# Patient Record
Sex: FEMALE | Race: WHITE | Employment: UNEMPLOYED | URBAN - NONMETROPOLITAN AREA
[De-identification: names, ages, dates, MRNs, and addresses within clinical notes are randomized per-mention and may not be internally consistent; named-entity substitution may affect disease eponyms.]

---

## 2017-11-20 ENCOUNTER — OFFICE VISIT (OUTPATIENT)
Dept: RHEUMATOLOGY | Age: 57
End: 2017-11-20
Payer: COMMERCIAL

## 2017-11-20 VITALS
SYSTOLIC BLOOD PRESSURE: 118 MMHG | HEART RATE: 96 BPM | DIASTOLIC BLOOD PRESSURE: 85 MMHG | BODY MASS INDEX: 34.34 KG/M2 | RESPIRATION RATE: 16 BRPM | WEIGHT: 193.8 LBS | HEIGHT: 63 IN

## 2017-11-20 DIAGNOSIS — M79.7 FIBROMYALGIA: ICD-10-CM

## 2017-11-20 DIAGNOSIS — G89.29 CHRONIC LEFT SHOULDER PAIN: ICD-10-CM

## 2017-11-20 DIAGNOSIS — M25.512 CHRONIC LEFT SHOULDER PAIN: ICD-10-CM

## 2017-11-20 DIAGNOSIS — G89.29 CHRONIC MIDLINE LOW BACK PAIN WITHOUT SCIATICA: ICD-10-CM

## 2017-11-20 DIAGNOSIS — M25.50 POLYARTHRALGIA: Primary | ICD-10-CM

## 2017-11-20 DIAGNOSIS — M54.50 CHRONIC MIDLINE LOW BACK PAIN WITHOUT SCIATICA: ICD-10-CM

## 2017-11-20 PROCEDURE — G8427 DOCREV CUR MEDS BY ELIG CLIN: HCPCS | Performed by: INTERNAL MEDICINE

## 2017-11-20 PROCEDURE — 3017F COLORECTAL CA SCREEN DOC REV: CPT | Performed by: INTERNAL MEDICINE

## 2017-11-20 PROCEDURE — G8484 FLU IMMUNIZE NO ADMIN: HCPCS | Performed by: INTERNAL MEDICINE

## 2017-11-20 PROCEDURE — 99244 OFF/OP CNSLTJ NEW/EST MOD 40: CPT | Performed by: INTERNAL MEDICINE

## 2017-11-20 PROCEDURE — G8417 CALC BMI ABV UP PARAM F/U: HCPCS | Performed by: INTERNAL MEDICINE

## 2017-11-20 PROCEDURE — 3014F SCREEN MAMMO DOC REV: CPT | Performed by: INTERNAL MEDICINE

## 2017-11-20 RX ORDER — HYDROCODONE BITARTRATE AND ACETAMINOPHEN 5; 325 MG/1; MG/1
1 TABLET ORAL EVERY 6 HOURS PRN
COMMUNITY
End: 2020-12-17 | Stop reason: SDUPTHER

## 2017-11-20 RX ORDER — FUROSEMIDE 20 MG/1
20 TABLET ORAL 2 TIMES DAILY
COMMUNITY
End: 2021-01-25 | Stop reason: SDUPTHER

## 2017-11-20 RX ORDER — PRASUGREL 10 MG/1
10 TABLET, FILM COATED ORAL DAILY
COMMUNITY
End: 2020-02-24 | Stop reason: SDUPTHER

## 2017-11-20 RX ORDER — DULOXETIN HYDROCHLORIDE 30 MG/1
30 CAPSULE, DELAYED RELEASE ORAL DAILY
Qty: 30 CAPSULE | Refills: 1 | Status: SHIPPED | OUTPATIENT
Start: 2017-11-20 | End: 2019-06-13 | Stop reason: ALTCHOICE

## 2017-11-20 ASSESSMENT — ENCOUNTER SYMPTOMS
EYES NEGATIVE: 1
SPUTUM PRODUCTION: 0
GASTROINTESTINAL NEGATIVE: 1
SHORTNESS OF BREATH: 1
COUGH: 0
WHEEZING: 0

## 2017-11-20 NOTE — LETTER
[address] [address]  Phone: [phone]  Fax: [phone]    Sally Post CNP        November 20, 2017       Patient: Shamar Orozco   MR Number: [id number]   YOB: 1960   Date of Visit: 11/20/2017       Dear Dr. Jory Conte: Thank you for the request for consultation for Anastasiia William to me for the evaluation of polyarthralgia. Below are the relevant portions of my assessment and plan of care. If you have questions, please do not hesitate to call me. I look forward to following Emma along with you.     Sincerely,        Ginger URBINA,     CC providers:  Aixa Corado CNP  [address] [place] [address]

## 2017-11-20 NOTE — PATIENT INSTRUCTIONS
adapted under license by [facility] ([facility]). If you have questions about a medical condition or this instruction, always ask your healthcare professional. [place] [address] any warranty or liability for your use of this information. Patient Education        Trochanteric Bursitis: Exercises  Your Care Instructions  Here are some examples of typical rehabilitation exercises for your condition. Start each exercise slowly. Ease off the exercise if you start to have pain. Your doctor or physical therapist will tell you when you can start these exercises and which ones will work best for you. How to do the exercises  Hamstring wall stretch    1. Lie on your back in a doorway, with your good leg through the open door. 2. Slide your affected leg up the wall to straighten your knee. You should feel a gentle stretch down the back of your leg. ¨ Do not arch your back. ¨ Do not bend either knee. ¨ Keep one heel touching the floor and the other heel touching the wall. Do not point your toes. 3. Hold the stretch for at least 1 minute to begin. Then try to lengthen the time you hold the stretch to as long as 6 minutes. 4. Repeat 2 to 4 times. If you do not have a place to do this exercise in a doorway, there is another way to do it:  4. Lie on your back, and bend the knee of your affected leg. 5. Loop a towel under the ball and toes of that foot, and hold the ends of the towel in your hands. 6. Straighten your knee, and slowly pull back on the towel. You should feel a gentle stretch down the back of your leg. 7. Hold the stretch for 15 to 30 seconds. Or even better, hold the stretch for 1 minute if you can. 8. Repeat 2 to 4 times. Straight-leg raises to the outside    6. Lie on your side, with your affected leg on top. 7. Tighten the front thigh muscles of your top leg to keep your knee straight.   8. Keep your hip and your leg straight in line with the rest of your body, and keep your knee pointing forward. Do not drop your hip back. 9. Lift your top leg straight up toward the ceiling, about 12 inches off the floor. Hold for about 6 seconds, then slowly lower your leg. 10. Repeat 8 to 12 times. Clamshell    5. Lie on your side, with your affected leg on top and your head propped on a pillow. Keep your feet and knees together and your knees bent. 6. Raise your top knee, but keep your feet together. Do not let your hips roll back. Your legs should open up like a clamshell. 7. Hold for 6 seconds. 8. Slowly lower your knee back down. Rest for 10 seconds. 9. Repeat 8 to 12 times. Standing quadriceps stretch    7. If you are not steady on your feet, hold on to a chair, counter, or wall. You can also lie on your stomach or your side to do this exercise. 8. Bend the knee of the leg you want to stretch, and reach behind you to grab the front of your foot or ankle with the hand on the same side. For example, if you are stretching your right leg, use your right hand. 9. Keeping your knees next to each other, pull your foot toward your buttock until you feel a gentle stretch across the front of your hip and down the front of your thigh. Your knee should be pointed directly to the ground, and not out to the side. 10. Hold the stretch for 15 to 30 seconds. 11. Repeat 2 to 4 times. Piriformis stretch    5. Lie on your back with your legs straight. 6. Lift your affected leg and bend your knee. With your opposite hand, reach across your body, and then gently pull your knee toward your opposite shoulder. 7. Hold the stretch for 15 to 30 seconds. 8. Repeat 2 to 4 times. Double knee-to-chest    1. Lie on your back with your knees bent and your feet flat on the floor. You can put a small pillow under your head and neck if it is more comfortable. 2. Bring both knees to your chest.  3. Keep your lower back pressed to the floor. Hold for 15 to 30 seconds.   4. Relax, and lower your knees to the starting as you move your arm up. Hold that position for a count of 15 to 30 seconds. Walk your fingers down to the starting position. Repeat 2 to 4 times, trying to reach higher each time. ¨ Wall climbing (to the front). Face a wall, standing so your fingers can just touch it. Walk the fingers of your affected arm up the wall as high as pain permits. Try not to shrug your shoulder up toward your ear as you move your arm up. Hold that position for a count of 15 to 30 seconds. Slowly walk your fingers to the starting position. Repeat 2 to 4 times, trying to reach higher each time. · Rest your shoulder when you are not doing stretches and other exercises. Your doctor may tell you to wait for the pain to go away before doing exercises. Do not lift heavy bags of groceries, play sports, or do anything else that makes you twist or stress your shoulder. Avoid activities where you move your affected arm above your head. When should you call for help? Call your doctor now or seek immediate medical care if:  · You have severe pain. · You cannot move your shoulder or arm. · You have tingling or numbness in your arm or hand. · Your arm or hand is cool or pale. Watch closely for changes in your health, and be sure to contact your doctor if:  · Your pain gets worse. · You have new or worse swelling in your arm or hand. · You do not get better as expected. Where can you learn more? Go to [email or web address] and sign in to your [employer] account. Enter E207 in the [facility] box to learn more about \"Rotator Cuff Problems: Care Instructions. \"     If you do not have an account, please click on the \"Sign Up Now\" link. Current as of: March 21, 2017  Content Version: 11.3  © 7160-5463 [employer]. Care instructions adapted under license by [facility] ([facility]).  If you have questions about a medical condition or this instruction, always ask your healthcare professional. Elier Payne disclaims any warranty or liability for your use of this information.

## 2017-11-20 NOTE — PROGRESS NOTES
Reported swelling without redness/warmth of the fingers. Dependent swelling of the ankles. - AM stiffness lasting hours, imprved with movement. (+) gelling  - (+) insomnia b/c of soreness, laying on the joint. - persistent fatigue  - Numbness of hands mainly at night and early morning.   - myalgia, \"everywhere\". -denies Photosenstivity, Rash, dry mouth/dry eyes, oral/nasal sores, Raynaud's, digital ulcerations, skin tightening, renal disease, foamy urination, hematuria, sz's, blood clots, pre-term labor loss, AIHA, leukpenia/lymphopenia, thrombocytopenia, hair loss, serositis, arthritis. - denies enthesitis, dactylitis, nail changes, hx of STD,  personal or family history of ank spond,       Cancer screening: up to date   Travel:denies   Exposure(s): denies  FmHX: Psoriatic arthritis: sister     ROS:  Review of Systems   Constitutional: Positive for malaise/fatigue. HENT: Negative. Eyes: Negative. Respiratory: Positive for shortness of breath (with rest). Negative for cough, sputum production and wheezing. Cardiovascular: Positive for chest pain and leg swelling. Gastrointestinal: Negative. Genitourinary: Negative. Musculoskeletal: Positive for myalgias. Skin: Negative. Neurological: Positive for tingling, weakness and headaches. Endo/Heme/Allergies: Bruises/bleeds easily. Psychiatric/Behavioral: The patient is nervous/anxious. PAST MEDICAL HISTORY  No past medical history on file.     SOCIAL HISTORY  Social History     Social History    Marital status:      Spouse name: N/A    Number of children: N/A    Years of education: N/A     Social History Main Topics    Smoking status: Current Every Day Smoker    Smokeless tobacco: Not on file    Alcohol use Yes    Drug use: Unknown    Sexual activity: Not on file     Other Topics Concern    Not on file     Social History Narrative    No narrative on file       FAMILY HISTORY  Family History   Problem Relation Age of Onset    Early Death Neg Hx        SURGICAL HISTORY  No past surgical history on file. ALLERGIES  No Known Allergies    CURRENT MEDICATIONS  Current Outpatient Prescriptions   Medication Sig Dispense Refill    ALPRAZolam (XANAX) 1 MG tablet Take 1 mg by mouth nightly as needed for Anxiety      aspirin 81 MG tablet Take 81 mg by mouth daily      clopidogrel (PLAVIX) 75 MG tablet Take 75 mg by mouth daily      estradiol (ESTRACE) 0.5 MG tablet Take 0.5 mg by mouth daily      vitamin B-12 (CYANOCOBALAMIN) 1000 MCG tablet Take 1,000 mcg by mouth daily      metoprolol tartrate (LOPRESSOR) 25 MG tablet Take 25 mg by mouth 2 times daily      DHA-EPA-Coenzyme Q10-Vitamin E (COQ-10 & FISH OIL PO) Take by mouth      atorvastatin (LIPITOR) 80 MG tablet Take 80 mg by mouth daily      Estrogens Conjugated (PREMARIN PO) Take  by mouth. No current facility-administered medications for this visit. Objective:  /85   Pulse 96   Resp 16   Ht 5' 2.99\" (1.6 m)   Wt 193 lb 12.8 oz (87.9 kg)   BMI 34.34 kg/m²     General: No distress. Alert. Eyes: P ERRL. No sclera icterus. No conjunctival injection. ENT: No discharge. Pharynx clear. Neck: Trachea midline. Normal thyroid. Resp: No accessory muscle use. No crackles. No wheezing. No rhonchi. No dullness on percussion. CV: Regular rate. Regular rhythm. No mumur or rub. No edema. M/S:   Upper extremities:  Shoulders: Muscle strength 4+/5 in the left shoulder, limited left shoulder ROM to around 120 degree flexion and extension     - (+) Lift off, infraspinatus, [place], speed. Bilateral hawking. Negative scarf. - AC and lateral shoulder bilateral tenderness   Elbow: tenderness bilaterally. (+) bilateral tinel testing. Wrists: (+) Tinel testing. Negative Phalen testing. Finger: tender left MCPs and 4th PIP.  No synovitis    - (+) bilateral finkelstein    Lower Extremities:   Muscle strength 5/5, FROM, No Synovitis   Hips: localized tenderness along the lateral hip. Knees: tenderness along the medial knees. Denies patellar grind, medial/lateral compression, anterior/posterior compression  Ankles: non-tender. Feet: right lateral deviation 5th DIP deviation. Back:   - intact ROM, Negative spurling compression, SLR and cross SLR  - tenderness and hypertonicity of the perspinal musculature of the entire spine, trapezius musculature, and periscapular tenderness. There are 18/18  tender fibromyalgia points. Neuro: CN II-XII grossly intact, DTR's 2/4 bilat and equal  Psych: Oriented to person, place, time. No anxiety or agitation. Skin: Warm and dry. No nodule on exposed extremities. No rash on exposed extremities. Lymph: No cervical LAD. No supraclavicular LAD. RAPID 3 18.3     LABS:  CBC  No results found for: WBC, RBC, HGB, HCT, MCV, MCH, MCHC, RDW, PLT    CMP  No results found for: CALCIUM, LABALBU, PROT, NA, K, CO2, CL, BUN, CREATININE, ALT, AST    HgBA1c: No components found for: HGBA1C    No results found for: TSHFT4, TSH  No results found for: VITD25      No results found for: PAT  No components found for: SSAABIGGREF  No components found for: SSBABIGGREF  No components found for: SMITHABIGGAR  No results found for: DSDNAAB   No results found for: C3, C4  No components found for: CYCCITPEPIGG  No results found for: RF    No components found for: CANCASCRN, APANCASCRN  No results found for: SEDRATE  No results found for: CRP    RADIOLOGY:       Assessment/ Plan:      Polyarthralgia: The patient reports having generalized myalgia and genearlized joint pains of the fingers/hands, Left shoulder, neck , entire spine, latearl hip, left knee and bilateral ankles. Most severe in the neck and back, but reports \"hurt all over\" Symptoms started: around 4213-6150 in the neck, back that then progressed to the shoulders,  Hands and has since become more generalized.  [place] Hensen down steps about 15 years ago (~2002), Denies other injuries, medications (muscle relaxants, ssri's) with the patient. - would defer further treatment options as above. - trail of cymbalta 30mg daily. We have discussed the potential risk with Cymbalta which include but not limited to  somnolence, fatigue, Nausea, vomiting, risk of allergic reaction, LFT elevation, acute glaucoma,  increased depression, nightmares,  and alix rey's dz to list a few with cymblata. Left shoulder pain:   - localized shoulder pain, limited and painful ROM. Difficulty reaching behind the back. Left shoulder (+) (+) Lift off, infraspinatus, [place], speed. Bilateral hawking. Negative scarf.    - ddx: rotator cuff syndrome vs bursitis vs impingement vs other.    - x-ray left shoulder discussed. - if shoulder pain persists could try physical therapy or shoulder injection       Bilateral Hand paresthesia:   - - Numbness of hands mainly at night and early morning and resting arms on arm rests. (+) Tinel testing bilateral cubital tunnel region and wrists. - 2016 EMG/NCV bilateral upper extremities Negative  - discussed elbow bracing,   - stretches provided. Bilateral hip pain:   - suspected trochanteric bursitis vs gluteal tendinosis  - prior bursal injections (significant relief)/   - home stretches and exercises provided. - discussed repeating bursal injections and trial of physical therapy. Chronic low back pain w/ rare radiculopathy:   - denies fecal/urinary incontinence, saddle anesthesia, wt loss, fevers, (+) Prolonged AM stiffness.   - prior X-ray evaluation with abnormalities per pt.    - discussed physical therapy. - cymbalta may help with chronic back and fibromyalgia. - prior treatment as above. Return in about 3 months (around 2/20/2018). Electronically signed by Erik Guy DO on 11/20/2017 at 11:00 AM      Thank you for allowing me to participate in the care of this patient. Please call if there are any questions.       Addendum: 12/1/17   -

## 2017-11-20 NOTE — Clinical Note
Please inform the blood testing revealed no abnoramlities. She is going to be discharged back to her PCP for treatment of her Underlying fibromyalgia. Recommendations for the hip and back pain have been provided to her PCP.

## 2017-11-20 NOTE — Clinical Note
Please inform the pt that the x-ray of the lower back revealed changes consistent with osteoarthritis. The left shoulder x-ray revealed changes of mild arthritis in the left shoulder around the acromioclavicular joint.

## 2017-12-01 LAB
A/G RATIO: 2.1 (ref 1.5–2.5)
ABSOLUTE BASO #: 100 /CMM (ref 0–200)
ABSOLUTE EOS #: 300 /CMM (ref 0–500)
ABSOLUTE LYMPH #: 2500 /CMM (ref 1000–4800)
ABSOLUTE MONO #: 600 /CMM (ref 0–800)
ABSOLUTE NEUT #: 4300 /CMM (ref 1800–7700)
ALBUMIN SERPL-MCNC: 4.6 GM/DL (ref 3.5–5)
ALP BLD-CCNC: 56 IU/L (ref 39–118)
ALT SERPL-CCNC: 30 IU/L (ref 10–40)
ANION GAP SERPL CALCULATED.3IONS-SCNC: 6 MMOL/L (ref 4–12)
AST SERPL-CCNC: 26 IU/L (ref 15–41)
BASOPHILS RELATIVE PERCENT: 0.7 % (ref 0–2)
BILIRUB SERPL-MCNC: 0.8 MG/DL (ref 0.2–1)
BUN BLDV-MCNC: 17 MG/DL (ref 7–20)
C-REACTIVE PROTEIN: < 0.5 MG/DL
CALCIUM SERPL-MCNC: 9.7 MG/DL (ref 8.8–10.5)
CHLORIDE BLD-SCNC: 102 MEQ/L (ref 101–111)
CO2: 29 MEQ/L (ref 21–32)
CREAT SERPL-MCNC: 0.93 MG/DL (ref 0.6–1.3)
CREATININE CLEARANCE: >60
EOSINOPHILS RELATIVE PERCENT: 3.4 % (ref 0–6)
GLUCOSE: 119 MG/DL (ref 70–110)
HCT VFR BLD CALC: 41 % (ref 35–44)
HEMOGLOBIN: 14.3 GM/DL (ref 12–15)
LYMPHOCYTES RELATIVE PERCENT: 31.8 % (ref 15–45)
MCH RBC QN AUTO: 32.5 PG (ref 27.5–33)
MCHC RBC AUTO-ENTMCNC: 34.9 GM/DL (ref 33–36)
MCV RBC AUTO: 93.2 CU MIC (ref 80–97)
MONOCYTES RELATIVE PERCENT: 7.7 % (ref 2–10)
NEUTROPHILS RELATIVE PERCENT: 56.4 % (ref 40–70)
NUCLEATED RBCS: 0.1 /100 WBC
PDW BLD-RTO: 12.9 % (ref 12–16)
PLATELET # BLD: 280 TH/CMM (ref 150–400)
POTASSIUM SERPL-SCNC: 4.7 MEQ/L (ref 3.6–5)
RBC # BLD: 4.4 MIL/CMM (ref 4–5.1)
SEDIMENTATION RATE, ERYTHROCYTE: 7 MM/HR
SODIUM BLD-SCNC: 137 MEQ/L (ref 135–145)
TOTAL PROTEIN: 6.8 G/DL (ref 6.2–8)
VITAMIN D 25-HYDROXY: 39.08 NG/ML (ref 30–100)
WBC # BLD: 7.7 TH/CMM (ref 4.4–10.5)

## 2017-12-04 ENCOUNTER — TELEPHONE (OUTPATIENT)
Dept: RHEUMATOLOGY | Age: 57
End: 2017-12-04

## 2019-05-07 ENCOUNTER — TELEPHONE (OUTPATIENT)
Dept: CARDIOLOGY CLINIC | Age: 59
End: 2019-05-07

## 2019-05-07 NOTE — TELEPHONE ENCOUNTER
Called Dr. Atul Schulz office, [facility] and Dr. Shahla Agudelo office at [facility] for records. Records being sent.

## 2019-06-11 ENCOUNTER — HOSPITAL ENCOUNTER (OUTPATIENT)
Age: 59
Discharge: HOME | End: 2019-06-11
Payer: MEDICARE

## 2019-06-11 LAB
ALBUMIN SERPL-MCNC: 4.2 G/DL (ref 3.5–5.1)
ALP BLD-CCNC: 84 U/L (ref 38–126)
ALT SERPL-CCNC: 20 U/L (ref 11–66)
ANION GAP SERPL CALCULATED.3IONS-SCNC: 12 MEQ/L (ref 8–16)
AST SERPL-CCNC: 20 U/L (ref 5–40)
BILIRUB SERPL-MCNC: 0.4 MG/DL (ref 0.3–1.2)
BILIRUBIN DIRECT: < 0.2 MG/DL (ref 0–0.3)
BUN BLDV-MCNC: 15 MG/DL (ref 7–22)
CALCIUM SERPL-MCNC: 9.8 MG/DL (ref 8.5–10.5)
CHLORIDE BLD-SCNC: 104 MEQ/L (ref 98–111)
CHOLESTEROL, TOTAL: 124 MG/DL (ref 100–199)
CO2: 25 MEQ/L (ref 23–33)
CREAT SERPL-MCNC: 0.7 MG/DL (ref 0.4–1.2)
GFR SERPL CREATININE-BSD FRML MDRD: 85 ML/MIN/1.73M2
GLUCOSE BLD-MCNC: 113 MG/DL (ref 70–108)
HDLC SERPL-MCNC: 66 MG/DL
LDL CHOLESTEROL CALCULATED: 25 MG/DL
MAGNESIUM: 2.2 MG/DL (ref 1.6–2.4)
POTASSIUM SERPL-SCNC: 4.3 MEQ/L (ref 3.5–5.2)
SODIUM BLD-SCNC: 141 MEQ/L (ref 135–145)
TOTAL PROTEIN: 7.1 G/DL (ref 6.1–8)
TRIGL SERPL-MCNC: 164 MG/DL (ref 0–199)

## 2019-06-11 PROCEDURE — 36415 COLL VENOUS BLD VENIPUNCTURE: CPT

## 2019-06-11 PROCEDURE — 82248 BILIRUBIN DIRECT: CPT

## 2019-06-11 PROCEDURE — 83735 ASSAY OF MAGNESIUM: CPT

## 2019-06-11 PROCEDURE — 80061 LIPID PANEL: CPT

## 2019-06-11 PROCEDURE — 80053 COMPREHEN METABOLIC PANEL: CPT

## 2019-06-13 ENCOUNTER — OFFICE VISIT (OUTPATIENT)
Dept: CARDIOLOGY CLINIC | Age: 59
End: 2019-06-13
Payer: MEDICARE

## 2019-06-13 VITALS
BODY MASS INDEX: 34.3 KG/M2 | HEIGHT: 63 IN | SYSTOLIC BLOOD PRESSURE: 130 MMHG | WEIGHT: 193.6 LBS | DIASTOLIC BLOOD PRESSURE: 82 MMHG | HEART RATE: 80 BPM

## 2019-06-13 DIAGNOSIS — I25.10 CORONARY ARTERY DISEASE INVOLVING NATIVE CORONARY ARTERY OF NATIVE HEART WITHOUT ANGINA PECTORIS: Primary | ICD-10-CM

## 2019-06-13 DIAGNOSIS — Z95.1 HX OF CABG: ICD-10-CM

## 2019-06-13 PROCEDURE — 99214 OFFICE O/P EST MOD 30 MIN: CPT | Performed by: INTERNAL MEDICINE

## 2019-06-13 RX ORDER — ISOSORBIDE MONONITRATE 60 MG/1
60 TABLET, EXTENDED RELEASE ORAL DAILY
COMMUNITY
End: 2019-06-13 | Stop reason: SDUPTHER

## 2019-06-13 RX ORDER — NITROGLYCERIN 0.4 MG/1
0.4 TABLET SUBLINGUAL EVERY 5 MIN PRN
Qty: 25 TABLET | Refills: 3 | Status: SHIPPED | OUTPATIENT
Start: 2019-06-13 | End: 2020-03-23 | Stop reason: SDUPTHER

## 2019-06-13 RX ORDER — BIOTIN 1 MG
1000 TABLET ORAL DAILY
COMMUNITY
End: 2021-06-03

## 2019-06-13 RX ORDER — ISOSORBIDE MONONITRATE 60 MG/1
60 TABLET, EXTENDED RELEASE ORAL DAILY
Qty: 90 TABLET | Refills: 3 | Status: SHIPPED | OUTPATIENT
Start: 2019-06-13 | End: 2020-06-25 | Stop reason: SDUPTHER

## 2019-06-13 RX ORDER — MULTIVIT WITH MINERALS/LUTEIN
1000 TABLET ORAL DAILY
COMMUNITY

## 2019-06-13 RX ORDER — NITROGLYCERIN 0.4 MG/1
0.4 TABLET SUBLINGUAL EVERY 5 MIN PRN
COMMUNITY
End: 2019-06-13 | Stop reason: SDUPTHER

## 2019-06-13 NOTE — PROGRESS NOTES
[address] [facility] CARDIOLOGY  [address] [address]  Dept: [phone]  Dept Fax: [phone]  Loc: [phone]    Visit Date: 6/13/2019    Ms. Shannon Zarate is a 60 y.o. female  who presented for:  Chief Complaint   Patient presents with    New Patient    Coronary Artery Disease       HPI:   HPI   59 yo F hx of CABG x 2 2015/PCI due to failed grafts who presents for follow-up. She has had ISR requiring intervention 2/2018--she had brachytherapy at [facility] in [place], [place]. No chest pain, angina, HART, orthopnea, PND, sob at rest, palpitations, LE edema, or syncope. She is on ASA/Effient. EF 50-55%, mild-moderate MR. Used NTG SL once last week. No significant a/t/d. No premature CAD. Current Outpatient Medications:     isosorbide mononitrate (IMDUR) 60 MG extended release tablet, Take 60 mg by mouth daily, Disp: , Rfl:     Biotin 1000 MCG TABS, Take by mouth, Disp: , Rfl:     Ascorbic Acid (VITAMIN C) 1000 MG tablet, Take 1,000 mg by mouth daily, Disp: , Rfl:     nitroGLYCERIN (NITROSTAT) 0.4 MG SL tablet, Place 0.4 mg under the tongue every 5 minutes as needed for Chest pain up to max of 3 total doses.  If no relief after 1 dose, call 911., Disp: , Rfl:     HYDROcodone-acetaminophen (NORCO) 5-325 MG per tablet, Take 1 tablet by mouth every 6 hours as needed for Pain ., Disp: , Rfl:     Multiple Vitamins-Minerals (CENTRUM SILVER 50+WOMEN) TABS, Take by mouth daily, Disp: , Rfl:     prasugrel (EFFIENT) 10 MG TABS, Take 10 mg by mouth daily, Disp: , Rfl:     furosemide (LASIX) 20 MG tablet, Take 20 mg by mouth 2 times daily , Disp: , Rfl:     ALPRAZolam (XANAX) 1 MG tablet, Take 1 mg by mouth nightly as needed for Anxiety, Disp: , Rfl:     aspirin 81 MG tablet, Take 81 mg by mouth daily, Disp: , Rfl:     estradiol (ESTRACE) 0.5 MG tablet, Take 0.5 mg by mouth daily, Disp: , Rfl:     vitamin B-12 (CYANOCOBALAMIN) 1000 MCG tablet, Take 1,000 mcg by mouth daily, Disp: , Rfl:     metoprolol tartrate (LOPRESSOR) 25 MG tablet, Take 25 mg by mouth 2 times daily, Disp: , Rfl:     DHA-EPA-Coenzyme Q10-Vitamin E (COQ-10 & FISH OIL PO), Take by mouth, Disp: , Rfl:     atorvastatin (LIPITOR) 80 MG tablet, Take 80 mg by mouth daily, Disp: , Rfl:     Past Medical History  Emma  has a past medical history of CAD (coronary artery disease), Fibromyalgia, Heart disease, and Hypertension. Social History  Emma  reports that she has quit smoking. She quit smokeless tobacco use about 3 years ago. She reports that she drinks alcohol. She reports that she does not use drugs. Family History  Emma family history includes Arthritis in her father; Heart Disease in her brother and father; Liver Disease in her mother. There is no family history of bicuspid aortic valve, aneurysms, heart transplant, pacemakers, defibrillators, or sudden cardiac death. Past Surgical History   Past Surgical History:   Procedure Laterality Date     SECTION  , 79    x2    CORONARY ANGIOPLASTY WITH STENT PLACEMENT  , , 2017    x3    CORONARY ARTERY BYPASS GRAFT  2015    x2     HERNIA REPAIR  1998    OTHER SURGICAL HISTORY      bracheballoon therapy    PARTIAL HYSTERECTOMY  1997    TONSILLECTOMY  1963       Review of Systems   Constitutional: Negative for chills and fever  HENT: Negative for congestion, sinus pressure, sneezing and sore throat. Eyes: Negative for pain, discharge, redness and itching. Respiratory: Negative for apnea, cough  Gastrointestinal: Negative for blood in stool, constipation, diarrhea   Endocrine: Negative for cold intolerance, heat intolerance, polydipsia. Genitourinary: Negative for dysuria, enuresis, flank pain and hematuria. Musculoskeletal: Negative for arthralgias, joint swelling and neck pain. Neurological: Negative for numbness and headaches.    Psychiatric/Behavioral: Negative for agitation, 06/11/2019    PROT 7.1 06/11/2019    BILITOT 0.4 06/11/2019    BILIDIR <0.2 06/11/2019    LABALBU 4.2 06/11/2019       Lab Results   Component Value Date    MG 2.2 06/11/2019       No results found for: INR, PROTIME      No results found for: LABA1C    Lab Results   Component Value Date    TRIG 164 06/11/2019    HDL 66 06/11/2019    LDLCALC 25 06/11/2019       No results found for: TSH      Testing Reviewed:      I have individually reviewed the cardiac test below:    ECHO: No results found for this or any previous visit. Assessment/Plan   CABG x 2, 2015  Hx of PCI for failed grafts  S/p Brachytherapy at [facility], 2/2018  Preserved EF  Mild-moderate MR  Very infrequent stable symptoms. Taking all medications. HR and BP well controlled. Follow with [facility] for recurrent PCI. Discussed diet/exercise/BP/weigh loss/health lifestyle choices/lipids; the patient understands the goals and will try to comply.     Disposition:  1 year         Electronically signed by Dinesh Alba MD   6/13/2019 at 11:34 AM

## 2019-11-22 ENCOUNTER — HOSPITAL ENCOUNTER (OUTPATIENT)
Dept: NON INVASIVE DIAGNOSTICS | Age: 59
Discharge: HOME | End: 2019-11-22
Payer: MEDICARE

## 2019-11-22 VITALS — BODY MASS INDEX: 31.89 KG/M2 | HEIGHT: 63 IN | WEIGHT: 180 LBS

## 2019-11-22 LAB
LV EF: 77 %
LVEF MODALITY: NORMAL

## 2019-11-22 PROCEDURE — 3430000000 HC RX DIAGNOSTIC RADIOPHARMACEUTICAL: Performed by: INTERNAL MEDICINE

## 2019-11-22 PROCEDURE — 93017 CV STRESS TEST TRACING ONLY: CPT | Performed by: INTERNAL MEDICINE

## 2019-11-22 PROCEDURE — A9500 TC99M SESTAMIBI: HCPCS | Performed by: INTERNAL MEDICINE

## 2019-11-22 PROCEDURE — 78452 HT MUSCLE IMAGE SPECT MULT: CPT

## 2019-11-22 PROCEDURE — 2709999900 HC NON-CHARGEABLE SUPPLY

## 2019-11-22 RX ADMIN — Medication 9 MILLICURIE: at 12:05

## 2019-11-22 RX ADMIN — Medication 30.9 MILLICURIE: at 13:25

## 2020-02-24 RX ORDER — PRASUGREL 10 MG/1
10 TABLET, FILM COATED ORAL DAILY
Qty: 90 TABLET | Refills: 1 | Status: SHIPPED | OUTPATIENT
Start: 2020-02-24 | End: 2020-06-25 | Stop reason: SDUPTHER

## 2020-03-23 RX ORDER — NITROGLYCERIN 0.4 MG/1
0.4 TABLET SUBLINGUAL EVERY 5 MIN PRN
Qty: 25 TABLET | Refills: 3 | Status: SHIPPED | OUTPATIENT
Start: 2020-03-23 | End: 2022-05-03 | Stop reason: SDUPTHER

## 2020-03-24 ENCOUNTER — HOSPITAL ENCOUNTER (OUTPATIENT)
Age: 60
Discharge: HOME | End: 2020-03-24
Payer: MEDICARE

## 2020-03-24 LAB
ALBUMIN SERPL-MCNC: 4.5 G/DL (ref 3.5–5.1)
ALP BLD-CCNC: 82 U/L (ref 38–126)
ALT SERPL-CCNC: 32 U/L (ref 11–66)
ANION GAP SERPL CALCULATED.3IONS-SCNC: 12 MEQ/L (ref 8–16)
AST SERPL-CCNC: 21 U/L (ref 5–40)
BILIRUB SERPL-MCNC: 0.5 MG/DL (ref 0.3–1.2)
BILIRUBIN DIRECT: < 0.2 MG/DL (ref 0–0.3)
BUN BLDV-MCNC: 18 MG/DL (ref 7–22)
CALCIUM SERPL-MCNC: 9.8 MG/DL (ref 8.5–10.5)
CHLORIDE BLD-SCNC: 102 MEQ/L (ref 98–111)
CO2: 28 MEQ/L (ref 23–33)
CREAT SERPL-MCNC: 0.9 MG/DL (ref 0.4–1.2)
GFR SERPL CREATININE-BSD FRML MDRD: 64 ML/MIN/1.73M2
GLUCOSE BLD-MCNC: 120 MG/DL (ref 70–108)
POTASSIUM SERPL-SCNC: 3.9 MEQ/L (ref 3.5–5.2)
SODIUM BLD-SCNC: 142 MEQ/L (ref 135–145)
TOTAL PROTEIN: 7.2 G/DL (ref 6.1–8)

## 2020-03-24 PROCEDURE — 36415 COLL VENOUS BLD VENIPUNCTURE: CPT

## 2020-03-24 PROCEDURE — 82248 BILIRUBIN DIRECT: CPT

## 2020-03-24 PROCEDURE — 80053 COMPREHEN METABOLIC PANEL: CPT

## 2020-05-06 ENCOUNTER — TELEPHONE (OUTPATIENT)
Dept: CARDIOLOGY CLINIC | Age: 60
End: 2020-05-06

## 2020-06-22 ENCOUNTER — TELEPHONE (OUTPATIENT)
Dept: CARDIOLOGY CLINIC | Age: 60
End: 2020-06-22

## 2020-06-22 NOTE — TELEPHONE ENCOUNTER
Patient asking for order to have lipid prior to next appt. It has been a year and she had liver done a couple months ago. Agree? Orders placed.

## 2020-06-23 ENCOUNTER — HOSPITAL ENCOUNTER (OUTPATIENT)
Age: 60
Discharge: HOME | End: 2020-06-23
Payer: MEDICARE

## 2020-06-23 LAB
CHOLESTEROL, TOTAL: 136 MG/DL (ref 100–199)
HDLC SERPL-MCNC: 58 MG/DL
LDL CHOLESTEROL CALCULATED: 48 MG/DL
TRIGL SERPL-MCNC: 150 MG/DL (ref 0–199)

## 2020-06-23 PROCEDURE — 80061 LIPID PANEL: CPT

## 2020-06-23 PROCEDURE — 36415 COLL VENOUS BLD VENIPUNCTURE: CPT

## 2020-06-25 ENCOUNTER — OFFICE VISIT (OUTPATIENT)
Dept: CARDIOLOGY CLINIC | Age: 60
End: 2020-06-25
Payer: MEDICARE

## 2020-06-25 VITALS
HEIGHT: 64 IN | SYSTOLIC BLOOD PRESSURE: 130 MMHG | BODY MASS INDEX: 32.1 KG/M2 | HEART RATE: 68 BPM | WEIGHT: 188 LBS | DIASTOLIC BLOOD PRESSURE: 76 MMHG

## 2020-06-25 PROCEDURE — 99214 OFFICE O/P EST MOD 30 MIN: CPT | Performed by: INTERNAL MEDICINE

## 2020-06-25 PROCEDURE — 93000 ELECTROCARDIOGRAM COMPLETE: CPT | Performed by: INTERNAL MEDICINE

## 2020-06-25 RX ORDER — ICOSAPENT ETHYL 1000 MG/1
2 CAPSULE ORAL 2 TIMES DAILY
Qty: 360 CAPSULE | Refills: 0 | Status: SHIPPED | OUTPATIENT
Start: 2020-06-25 | End: 2020-11-03

## 2020-06-25 RX ORDER — DULOXETIN HYDROCHLORIDE 30 MG/1
30 CAPSULE, DELAYED RELEASE ORAL DAILY
COMMUNITY
End: 2020-11-03

## 2020-06-25 RX ORDER — ICOSAPENT ETHYL 1000 MG/1
2 CAPSULE ORAL 2 TIMES DAILY
Qty: 60 CAPSULE | Refills: 3 | Status: SHIPPED | OUTPATIENT
Start: 2020-06-25 | End: 2020-06-25 | Stop reason: SDUPTHER

## 2020-06-25 RX ORDER — ISOSORBIDE MONONITRATE 60 MG/1
60 TABLET, EXTENDED RELEASE ORAL DAILY
Qty: 90 TABLET | Refills: 3 | Status: SHIPPED | OUTPATIENT
Start: 2020-06-25

## 2020-06-25 RX ORDER — PRASUGREL 10 MG/1
10 TABLET, FILM COATED ORAL DAILY
Qty: 90 TABLET | Refills: 3 | Status: ON HOLD | OUTPATIENT
Start: 2020-06-25 | End: 2020-11-04 | Stop reason: HOSPADM

## 2020-06-25 RX ORDER — CYCLOBENZAPRINE HCL 10 MG
10 TABLET ORAL 3 TIMES DAILY PRN
COMMUNITY

## 2020-06-25 RX ORDER — HYDROXYZINE HYDROCHLORIDE 10 MG/1
10 TABLET, FILM COATED ORAL 3 TIMES DAILY PRN
COMMUNITY
End: 2020-11-03

## 2020-06-25 NOTE — PROGRESS NOTES
Follow-up. EKG completed.
by mouth nightly as needed for Anxiety, Disp: , Rfl:     aspirin 81 MG tablet, Take 81 mg by mouth daily, Disp: , Rfl:     vitamin B-12 (CYANOCOBALAMIN) 1000 MCG tablet, Take 1,000 mcg by mouth daily, Disp: , Rfl:     metoprolol tartrate (LOPRESSOR) 25 MG tablet, Take 25 mg by mouth 2 times daily, Disp: , Rfl:     DHA-EPA-Coenzyme Q10-Vitamin E (COQ-10 & FISH OIL PO), Take by mouth, Disp: , Rfl:     atorvastatin (LIPITOR) 80 MG tablet, Take 80 mg by mouth daily, Disp: , Rfl:     DULoxetine (CYMBALTA) 30 MG extended release capsule, Take 30 mg by mouth daily Hasn't started yet, Disp: , Rfl:     Past Medical History  Emma  has a past medical history of CAD (coronary artery disease), Fibromyalgia, Heart disease, and Hypertension. Social History  Emma  reports that she has quit smoking. She quit smokeless tobacco use about 4 years ago. She reports current alcohol use. She reports that she does not use drugs. Family History  Emma family history includes Arthritis in her father; Heart Disease in her brother and father; Liver Disease in her mother. There is no family history of bicuspid aortic valve, aneurysms, heart transplant, pacemakers, defibrillators, or sudden cardiac death. Past Surgical History   Past Surgical History:   Procedure Laterality Date     SECTION  , 80    x2    CORONARY ANGIOPLASTY WITH STENT PLACEMENT  , , 2017    x3    CORONARY ARTERY BYPASS GRAFT  2015    x2     HERNIA REPAIR      OTHER SURGICAL HISTORY      bracheballoon therapy    PARTIAL HYSTERECTOMY  1997    TONSILLECTOMY  1963       Review of Systems   Constitutional: Negative for chills and fever  HENT: Negative for congestion, sinus pressure, sneezing and sore throat. Eyes: Negative for pain, discharge, redness and itching.    Respiratory: Negative for apnea, cough  Gastrointestinal: Negative for blood in stool, constipation, diarrhea   Endocrine: Negative for cold intolerance, heat

## 2020-06-29 ENCOUNTER — OFFICE VISIT (OUTPATIENT)
Dept: PHYSICAL MEDICINE AND REHAB | Age: 60
End: 2020-06-29
Payer: MEDICARE

## 2020-06-29 VITALS
TEMPERATURE: 97.2 F | SYSTOLIC BLOOD PRESSURE: 120 MMHG | HEART RATE: 66 BPM | HEIGHT: 64 IN | WEIGHT: 188 LBS | DIASTOLIC BLOOD PRESSURE: 68 MMHG | BODY MASS INDEX: 32.1 KG/M2

## 2020-06-29 PROCEDURE — 99204 OFFICE O/P NEW MOD 45 MIN: CPT | Performed by: NURSE PRACTITIONER

## 2020-06-29 ASSESSMENT — ENCOUNTER SYMPTOMS
CHEST TIGHTNESS: 0
SORE THROAT: 0
BACK PAIN: 1
SHORTNESS OF BREATH: 0
EYE PAIN: 0
CONSTIPATION: 0
WHEEZING: 0
RHINORRHEA: 0
PHOTOPHOBIA: 0
DIARRHEA: 0
NAUSEA: 0
ABDOMINAL PAIN: 0
COUGH: 0
COLOR CHANGE: 0
SINUS PRESSURE: 0
VOMITING: 0

## 2020-06-29 NOTE — PROGRESS NOTES
[facility]  [address]  Dept: [phone]  Dept Fax: [phone]: [phone]    Visit Date: 6/29/2020    Jefe Mandel is a 60 y.o. female who is referred for pain management evaluation and treatment per Dr. Adrián Wen. CAGE and CAGE-AID Questions   1. In the last three months, have you felt you should cut down or stop drinking or using drugs? Yes []        No [x]     2. In the last three months, has anyone annoyed you or gotten on your nerves by telling you to cut down or stop drinking or using drugs? Yes []        No [x]     3. In the last three months, have you felt guilty or bad about how much you drink or use drugs? Yes []        No [x]     4. In the last three months, have you been waking up wanting to have an alcoholic drink or use drugs? Yes []        No [x]        Opioid Risk Tool:  Clinician Form       1. Family History of Substance Abuse: Female Male    Alcohol   []1   []3    Illegal drugs   []2   []3    Prescription drugs     []4   []4   2. Personal History of Substance Abuse:          Alcohol   []3   []3    Illegal drugs   []4   []4    Prescription drugs     []5   []5   3. Age (jeremiah box if between 12 and 39):     []1   []1   4. History of Preadolescent Sexual Abuse:     []3   []0   5. Psychological Disease:      Attention deficit disorder, obsessive-compulsive disorder, bipolar, schizophrenia   []2   []2      Depression     []1   []1    Scoring Totals       Total Score  Low Risk  Moderate Risk  High Risk   Risk Category   0 - 3   4 - 7   8 or Above      Patient states symptoms interfere with:  A.  General Activity:  yes   B. Mood: yes    C. Walking Ability:   yes   D. Normal Work (Includes both work outside the home and housework):   yes    E.  Relations with Other People:  yes   F. Sleep:   yes   G.  Enjoyment of Life:  yes       HPI: mouth daily, Disp: 90 tablet, Rfl: 3    isosorbide mononitrate (IMDUR) 60 MG extended release tablet, Take 1 tablet by mouth daily, Disp: 90 tablet, Rfl: 3    metoprolol tartrate (LOPRESSOR) 25 MG tablet, Take 1 tablet by mouth 2 times daily, Disp: 180 tablet, Rfl: 3    Icosapent Ethyl (VASCEPA) 1 g CAPS capsule, Take 2 capsules by mouth 2 times daily, Disp: 360 capsule, Rfl: 0    nitroGLYCERIN (NITROSTAT) 0.4 MG SL tablet, Place 1 tablet under the tongue every 5 minutes as needed for Chest pain up to max of 3 total doses. If no relief after 1 dose, call 911., Disp: 25 tablet, Rfl: 3    Biotin 1000 MCG TABS, Take by mouth, Disp: , Rfl:     Ascorbic Acid (VITAMIN C) 1000 MG tablet, Take 1,000 mg by mouth daily, Disp: , Rfl:     HYDROcodone-acetaminophen (NORCO) 5-325 MG per tablet, Take 1 tablet by mouth every 6 hours as needed for Pain ., Disp: , Rfl:     Multiple Vitamins-Minerals (CENTRUM SILVER 50+WOMEN) TABS, Take by mouth daily, Disp: , Rfl:     furosemide (LASIX) 20 MG tablet, Take 20 mg by mouth 2 times daily , Disp: , Rfl:     ALPRAZolam (XANAX) 1 MG tablet, Take 1 mg by mouth nightly as needed for Anxiety, Disp: , Rfl:     aspirin 81 MG tablet, Take 81 mg by mouth daily, Disp: , Rfl:     vitamin B-12 (CYANOCOBALAMIN) 1000 MCG tablet, Take 1,000 mcg by mouth daily, Disp: , Rfl:     DHA-EPA-Coenzyme Q10-Vitamin E (COQ-10 & FISH OIL PO), Take by mouth, Disp: , Rfl:     atorvastatin (LIPITOR) 80 MG tablet, Take 80 mg by mouth daily, Disp: , Rfl:     Subjective:      Review of Systems   Constitutional: Positive for activity change. Negative for appetite change, chills, diaphoresis, fatigue, fever and unexpected weight change. HENT: Negative for congestion, ear pain, hearing loss, mouth sores, nosebleeds, rhinorrhea, sinus pressure and sore throat. Eyes: Negative for photophobia, pain and visual disturbance. Respiratory: Negative for cough, chest tightness, shortness of breath and wheezing. Tenderness present. Comments: Trigger finger injections in past   Skin:     General: Skin is warm. Coloration: Skin is not pale. Findings: No erythema or rash. Neurological:      Mental Status: She is alert and oriented to person, place, and time. She is not disoriented. Cranial Nerves: Cranial nerves are intact. No cranial nerve deficit. Sensory: Sensation is intact. No sensory deficit. Motor: Motor function is intact. No atrophy or abnormal muscle tone. Coordination: Coordination is intact. Coordination normal.      Gait: Gait is intact. Gait normal.      Deep Tendon Reflexes: Reflexes are normal and symmetric. Babinski sign absent on the right side. Reflex Scores:       Tricep reflexes are 2+ on the right side and 2+ on the left side. Bicep reflexes are 2+ on the right side and 2+ on the left side. Brachioradialis reflexes are 2+ on the right side and 2+ on the left side. Patellar reflexes are 2+ on the right side and 2+ on the left side. Achilles reflexes are 2+ on the right side and 2+ on the left side. Comments: Motor 4/5 LLE 4/5 BUE  SLR-   Psychiatric:         Attention and Perception: Attention and perception normal. She is attentive. Mood and Affect: Mood and affect normal. Mood is not anxious or depressed. Affect is not labile, blunt, angry or inappropriate. Speech: Speech normal. She is communicative. Speech is not rapid and pressured, delayed, slurred or tangential.         Behavior: Behavior normal. Behavior is not agitated, slowed, aggressive, withdrawn, hyperactive or combative. Behavior is cooperative. Thought Content: Thought content normal. Thought content is not paranoid or delusional. Thought content does not include homicidal or suicidal ideation. Thought content does not include homicidal or suicidal plan. Cognition and Memory: Cognition and memory normal. Memory is not impaired.  She does not exhibit impaired recent memory or impaired remote memory. Judgment: Judgment normal. Judgment is not impulsive or inappropriate. Comments:  anxiety depression        HARIKA test:+  Yeoman's test: +  Gaenslen test:+     Assessment:     1. Lumbar spondylosis    2. DDD (degenerative disc disease), lumbar    3. Fibromyalgia    4. Spinal stenosis of lumbar region, unspecified whether neurogenic claudication present    5. Chronic pain syndrome    6. Myofascial pain syndrome    7. Cervical pain    8. SI (sacroiliac) joint inflammation ([facility] [address]            Plan:      · Patient read and signed orientation and opioid agreement. · OARRS reviewed. Current MED: 21   · Patient was not offered naloxone for home. · Discussed long term side effects of medications, tolerance, dependency and addiction. · UDS preformed today. · Patient told can not receive any pain medications from any other source. · No evidence of abuse, diversion or aberrant behavior. · Prescription Needs: Await clean UDS for future prescription needs   Medications and/or procedures to improve function and quality of life- patient understanding with this and that may not be pain free   Discussed possible weaning of medication dosing dependent on treatment/procedure results.  Discussed with patient about safe storage of medications at home   Testing: reviewed Lumbar XR will obtain cervical thoracic lumbar XR    Procedures: Discussed TPI dry needling acupuncture PT, Lumbar facet MBB LESI will need PT and updated imaging.  Discussed with patient about risks with procedure including infection, reaction to medication, increased pain, or bleeding.  Medications:Continue Cymbalta Norco Flexeril Atarax per PCP, Discussed we won't prescribe Narcotics if UDS + THC  ETOH, she states was 1 drink in 3 months and used CBD oil.     Discussed PT dry needling   Pt will bring all spine XR's from Chiropractor to upload to chart      Previous Treatments tried:  · PT: Yes,  any benefit? Yes, how many weeks? 4-6, last date done:  2019  · NSAIDs: Yes,  any benefit? Yes,  how long taken:  Months to years  · Chiropractic: Yes,  any benefit? Yes  · Muscle relaxants: Yes,  any benefit? Yes  · Narcotics: Yes,  any benefit? Yes  · Spine surgeon consult: No  · Any Implants: Yes chest wires hernia mesh     Meds. Prescribed:   No orders of the defined types were placed in this encounter. Return lumbar sacral hip TPI and then cervical thoracic TPI with Freya, for Follow up after procedure. Time spent with patient was 60 minutes more than 50% was spent  Counseling/coordinated the patient'scare.     Electronically signed by LEIGAH Cardona CNP on 6/29/2020 at 11:19 AM

## 2020-07-20 ENCOUNTER — NURSE ONLY (OUTPATIENT)
Dept: PHYSICAL MEDICINE AND REHAB | Age: 60
End: 2020-07-20
Payer: MEDICARE

## 2020-07-20 VITALS
DIASTOLIC BLOOD PRESSURE: 82 MMHG | HEIGHT: 64 IN | SYSTOLIC BLOOD PRESSURE: 138 MMHG | BODY MASS INDEX: 32.1 KG/M2 | TEMPERATURE: 97.3 F | WEIGHT: 188 LBS

## 2020-07-20 PROCEDURE — 20553 NJX 1/MLT TRIGGER POINTS 3/>: CPT | Performed by: NURSE PRACTITIONER

## 2020-07-20 RX ORDER — HYDROCODONE BITARTRATE AND ACETAMINOPHEN 5; 325 MG/1; MG/1
1 TABLET ORAL EVERY 8 HOURS PRN
Qty: 90 TABLET | Refills: 0 | Status: SHIPPED | OUTPATIENT
Start: 2020-07-20 | End: 2020-08-19

## 2020-07-20 NOTE — PROGRESS NOTES
Procedure  Visit Date: 7/20/20    Tabitha Cordon is a 60 y.o. female presents today in the office for the following:  Chief Complaint   Patient presents with    Injections     cervical and lumbar TPI        History of Present Illness   Jesus Lenz is here today for trigger point injections. Pre-Op Diagnosis   Fibromyalgia     Post-Op Diagnosis   Fibromyalgia     Procedure Documentation   Patient identified. Consent signed. Site identified. A solution of 9cc 0.25% marcaine and 40mg (1cc) DepoMedrol was combined in each syringe. Site was sprayed with Ethyl chloride and then prepped with alcohol swap X 3. Then with a 25g needle entered each trigger point and after negative aspiration, injected 1-2 cc of Marcaine/DepoMedrol solution at each site. A total of 20 cc was used for procedure. Total of 8 sites were injected into 6 muscles. Vitals:    07/20/20 0801   BP: 138/82   Site: Left Upper Arm   Position: Sitting   Cuff Size: Medium Adult   Temp: 97.3 °F (36.3 °C)   Weight: 188 lb (85.3 kg)   Height: 5' 3.5\" (1.613 m)       Conclusion   No complications encountered. Patient discharged stable condition. Patient told if any problems to call office or go to ER. No orders of the defined types were placed in this encounter.       Electronically signed by LEIGHA Wu CNP on 7/20/20 at 8:16 AM EDT

## 2020-09-23 ENCOUNTER — OFFICE VISIT (OUTPATIENT)
Dept: PHYSICAL MEDICINE AND REHAB | Age: 60
End: 2020-09-23
Payer: MEDICARE

## 2020-09-23 VITALS
BODY MASS INDEX: 32.1 KG/M2 | SYSTOLIC BLOOD PRESSURE: 124 MMHG | WEIGHT: 188 LBS | HEIGHT: 64 IN | DIASTOLIC BLOOD PRESSURE: 78 MMHG

## 2020-09-23 PROCEDURE — 99214 OFFICE O/P EST MOD 30 MIN: CPT | Performed by: NURSE PRACTITIONER

## 2020-09-23 RX ORDER — HYDROCODONE BITARTRATE AND ACETAMINOPHEN 5; 325 MG/1; MG/1
1 TABLET ORAL EVERY 8 HOURS PRN
Qty: 90 TABLET | Refills: 0 | Status: SHIPPED | OUTPATIENT
Start: 2020-09-23 | End: 2020-10-23

## 2020-09-23 ASSESSMENT — ENCOUNTER SYMPTOMS
BACK PAIN: 1
SORE THROAT: 0
CONSTIPATION: 0
EYE PAIN: 0
WHEEZING: 0
COUGH: 0
SINUS PRESSURE: 0
SHORTNESS OF BREATH: 0
PHOTOPHOBIA: 0
CHEST TIGHTNESS: 0
DIARRHEA: 0
NAUSEA: 0
ABDOMINAL PAIN: 0
COLOR CHANGE: 0
VOMITING: 0
RHINORRHEA: 0

## 2020-09-29 ENCOUNTER — HOSPITAL ENCOUNTER (OUTPATIENT)
Dept: GENERAL RADIOLOGY | Age: 60
Discharge: HOME | End: 2020-09-29
Payer: MEDICARE

## 2020-09-29 ENCOUNTER — HOSPITAL ENCOUNTER (OUTPATIENT)
Age: 60
Discharge: HOME | End: 2020-09-29
Payer: MEDICARE

## 2020-09-29 PROCEDURE — 72040 X-RAY EXAM NECK SPINE 2-3 VW: CPT

## 2020-09-29 PROCEDURE — 73030 X-RAY EXAM OF SHOULDER: CPT

## 2020-09-29 PROCEDURE — 72100 X-RAY EXAM L-S SPINE 2/3 VWS: CPT

## 2020-09-30 ENCOUNTER — OFFICE VISIT (OUTPATIENT)
Dept: PHYSICAL MEDICINE AND REHAB | Age: 60
End: 2020-09-30
Payer: MEDICARE

## 2020-09-30 VITALS
WEIGHT: 188 LBS | DIASTOLIC BLOOD PRESSURE: 74 MMHG | HEIGHT: 64 IN | SYSTOLIC BLOOD PRESSURE: 118 MMHG | BODY MASS INDEX: 32.1 KG/M2

## 2020-09-30 PROCEDURE — 20610 DRAIN/INJ JOINT/BURSA W/O US: CPT | Performed by: NURSE PRACTITIONER

## 2020-09-30 RX ORDER — METHYLPREDNISOLONE ACETATE 40 MG/ML
80 INJECTION, SUSPENSION INTRA-ARTICULAR; INTRALESIONAL; INTRAMUSCULAR; SOFT TISSUE ONCE
Status: COMPLETED | OUTPATIENT
Start: 2020-09-30 | End: 2020-09-30

## 2020-09-30 RX ADMIN — METHYLPREDNISOLONE ACETATE 80 MG: 40 INJECTION, SUSPENSION INTRA-ARTICULAR; INTRALESIONAL; INTRAMUSCULAR; SOFT TISSUE at 09:43

## 2020-09-30 NOTE — PROGRESS NOTES
Procedure  Visit Date: 9/30/20    Elias Cruz is a 60 y.o. female presents today in the office for the following:  Chief Complaint   Patient presents with    Follow-up     left shoulder injection        History of Present Illness   Lois Roque is here today for left shoulder injection. Pre-Op Diagnosis   DJD    Post-Op Diagnosis   DJD    Procedure Documentation   Patient identified. Consent signed. Site identified. Site was prepped with alcohol swap X 3. Then with a 26g needle entered the joint and after negative aspiration injected 2 cc of 0.25% marcaine MPF with 80 mg of depomedrol. Vitals:    09/30/20 0923   BP: 118/74   Weight: 188 lb (85.3 kg)   Height: 5' 3.5\" (1.613 m)       Conclusion   No complications encountered. Patient discharged stable condition. Patient told if any problems to call office or go to ER.     Orders Placed This Encounter   Medications    methylPREDNISolone acetate (DEPO-MEDROL) injection 80 mg       Electronically signed by LEIGHA Herrera CNP on 9/30/20 at 9:35 AM EDT

## 2020-10-05 ENCOUNTER — HOSPITAL ENCOUNTER (OUTPATIENT)
Dept: PHYSICAL THERAPY | Age: 60
Setting detail: THERAPIES SERIES
Discharge: HOME | End: 2020-10-05
Payer: MEDICARE

## 2020-10-05 PROCEDURE — 97161 PT EVAL LOW COMPLEX 20 MIN: CPT

## 2020-10-05 NOTE — PROGRESS NOTES
** PLEASE SIGN, DATE AND TIME CERTIFICATION BELOW AND RETURN TO [facility] OUTPATIENT REHABILITATION (FAX #: [phone]). ATTEST/CO-SIGN IF ACCESSING VIA [employer]Ide[employer]. THANK YOU.**    I certify that I have examined the patient below and determined that Physical Medicine and Rehabilitation service is necessary and that I approve the established plan of care for up to 90 days or as specifically noted. Attestation, signature or co-signature of physician indicates approval of certification requirements.    ________________________ ____________ __________  Physician Signature   Date   Time  [facility]  PHYSICAL THERAPY  [x] EVALUATION  [] DAILY NOTE (LAND) [] DAILY NOTE (AQUATIC ) [] PROGRESS NOTE [] DISCHARGE NOTE    [x] [facility]   [] [place] [address]    [] [facility]   [] Jono Dance    Date: 10/5/2020  Patient Name:  Therese Tyler  : 1960  MRN: [id number]    Referring Practitioner LEIGHA Villarreal - *   Diagnosis Cervicalgia [M54.2]    Treatment Diagnosis Neck and left shoulder pain    Date of Evaluation 10/5/20    Additional Pertinent History Fibromyalgia, post open heart surgery 6 years ago,       Functional Outcome Measure Used  Oswestry NDI     Functional Outcome Score     13 (10/5/20)       Insurance: Primary: Payor: [facility] /  /  / ,   Secondary:    Authorization Information: $40 copay per visit    Visit # 1, 1/10 for progress note   Visits Allowed:    Recertification Date: 8 weeks,(2020)   Physician Follow-Up: Unknown    Physician Orders:    History of Present Illness: Pain since April or May after doing yardwork. Digging up bricks and carrying themin the left arm and readjusted -- moderate increased pain in the left neck and a to elbow and chest. She has had chest pain since heart surgery about 6 years ago.       SUBJECTIVE:  Cortisone injection in the left shoulder on   -- did not relieve pain. Social/Functional History and Current Status:  Medications and Allergies have been reviewed and are listed on Medical History Questionnaire. Racquel Nguyen lives with spouse in a single story home with stairs and a handrail to enter. Task Previous Current   ADLs  Independent Independent   IADL's Independent Independent   Ambulation Independent Independent   Transfers Independent Independent   Recreation Independent Independent able to [address]   Driving Active  Active    Work On Disability  On Disability LBP, heart , fibromyalgia     OBJECTIVE:    Pain: 0/10 with sitting still, increases to a 10/10  , left head , neck , jaw, and into chest.   Into left shoulder, not past elbow. Palpation Tightness in the SO region, left scalenes, levator    Observation Decreased lordosis, forward head, increased kyphosis, rounded shoulders.             Range of Motion NECK RANGE OF MOTION  (% of normal )     Flexion 50 increased left posterior neck pain    Extension 50  Mild pain increased    Rotation Right 60   Rotation Left 60   Sidebending Right 60    Sidebending Left 50 and increased left side pain    Retraction wfl       shoulder flex L135, R 155, - pain lateral neck and into chest, shoulder abd L 160 R 160  , IR and ER  75 B ,           Strength Left shoulder flex and abd 3+ with increased pain,  ER and IR 4/5 without increased pain , Right shoulder 4/5, B scap retraction 4-, mid trap 4- B ,     Coordination    Sensation Denies numbness and tingling in the UE    Bed Mobility    Transfers    Ambulation    Stairs    Balance    Special Tests Manual traction- and supine lying-  Position of confort-- supine with roll under neck          TREATMENT   Precautions:    Pain:     X in shaded column indicates activity completed today   Modalities Parameters/  Location  Notes                     Manual Therapy Time/Technique  Notes   SO release, manual CT  8 min   x Relief in headache and tightness after SO release. Exercise/Intervention   Notes                                                                                  Specific Interventions Next Treatment: SO release, stretching, add scap strengthening,     Activity/Treatment Tolerance:  [x]  Patient tolerated treatment well  []  Patient limited by fatigue  []  Patient limited by pain   []  Patient limited by medical complications  []  Other:     Assessment: pt appear to have a soft tissue/joint strain/sprain to her left cervical region. Body Structures/Functions/Activity Limitations: impaired muscle tone, impaired ROM, impaired strength, pain and decreased posture  Prognosis: good    GOALS:  Patient Goal: less pain , turn head better      Short Term Goals:  Time Frame: 4 weeks  Increase CROM to 75% of normal  Increase scap strength to 4+ and left shoulder strength to4/5  Decrease pain to less than 5/10 when present      Long Term Goals:  Time Frame: 8 weeks  Increase CROM to [facility] without increased pain to vie a car without compensation  Increase B shoulder and scap strength to 5/5 so patient can reach overhead without increased neck pain   I HEP      Patient Education:   [x]  HEP/Education Completed: Plan of Care, Goals, shoulder rolls.  Medbridge Access Code:  []  No new Education completed  []  Reviewed Prior HEP      []  Patient verbalized and/or demonstrated understanding of education provided. []  Patient unable to verbalize and/or demonstrate understanding of education provided. Will continue education. [x]  Barriers to learning: none    PLAN:  Treatment Recommendations: Strengthening, Range of Motion, Manual Therapy - Soft Tissue Mobilization, Manual Therapy - Joint Manipulation, Pain Management, Home Exercise Program, Patient Education and Modalities    [x]  Plan of care initiated.   Plan to see patient 2 times per week for 8 weeks to address the treatment

## 2020-10-08 ENCOUNTER — HOSPITAL ENCOUNTER (OUTPATIENT)
Dept: PHYSICAL THERAPY | Age: 60
Setting detail: THERAPIES SERIES
Discharge: HOME | End: 2020-10-08
Payer: MEDICARE

## 2020-10-08 PROCEDURE — 97140 MANUAL THERAPY 1/> REGIONS: CPT

## 2020-10-08 PROCEDURE — 97110 THERAPEUTIC EXERCISES: CPT

## 2020-10-08 NOTE — PROGRESS NOTES
[facility]  PHYSICAL THERAPY  [] EVALUATION  [x] DAILY NOTE (LAND) [] DAILY NOTE (AQUATIC ) [] PROGRESS NOTE [] DISCHARGE NOTE    [x] [facility]   [] [place] [address]    [] [facility]   [] April Jacobo    Date: 10/8/2020  Patient Name:  Emil Maher  : 1960  MRN: [id number]    Referring Practitioner LEIGHA Gaspar - *   Diagnosis Cervicalgia [M54.2]    Treatment Diagnosis Neck and left shoulder pain    Date of Evaluation 10/5/20    Additional Pertinent History Fibromyalgia, post open heart surgery 6 years ago,       Functional Outcome Measure Used  Oswestry NDI     Functional Outcome Score     13 (10/5/20)       Insurance: Primary: Payor: [employer] /  /  / ,   Secondary:    Authorization Information: $40 copay per visit    Visit # 2,2/10 for progress note   Visits Allowed:    Recertification Date: 8 weeks,(2020)   Physician Follow-Up: Unknown    Physician Orders:    History of Present Illness: Pain since April or May after doing yardwork. Digging up bricks and carrying themin the left arm and readjusted -- moderate increased pain in the left neck and a to elbow and chest. She has had chest pain since heart surgery about 6 years ago. SUBJECTIVE:  Cortisone injection in the left shoulder on   -- did not relieve pain. TREATMENT   Precautions:    Pain:     X in shaded column indicates activity completed today   Modalities Parameters/  Location  Notes                     Manual Therapy Time/Technique  Notes   SO release, manual CT  8 min   x Relief in headache and tightness after SO release.                 Exercise/Intervention   Notes   T band ret, ext abd, ER, flex , scap squeeze  Orange 15 reps fatigue after   X                                                                             Specific Interventions Next Treatment: SO release, stretching, add scap strengthening,     Activity/Treatment Tolerance:  [x]  Patient tolerated treatment well  []  Patient limited by fatigue  [x]  Patient limited by pain   []  Patient limited by medical complications  []  Other:     Assessment: pain relief after STM   Body Structures/Functions/Activity Limitations: impaired muscle tone, impaired ROM, impaired strength, pain and decreased posture  Prognosis: good    GOALS:  Patient Goal: less pain , turn head better      Short Term Goals:  Time Frame: 4 weeks  Increase CROM to 75% of normal  Increase scap strength to 4+ and left shoulder strength to4/5  Decrease pain to less than 5/10 when present      Long Term Goals:  Time Frame: 8 weeks  Increase CROM to [facility] without increased pain to eric a car without compensation  Increase B shoulder and scap strength to 5/5 so patient can reach overhead without increased neck pain   I HEP      Patient Education:   [x]  HEP/Education Completed: Plan of Care, Goals, shoulder rolls.  [email or web address] Access Code:  []  No new Education completed  []  Reviewed Prior HEP      []  Patient verbalized and/or demonstrated understanding of education provided. []  Patient unable to verbalize and/or demonstrate understanding of education provided. Will continue education. [x]  Barriers to learning: none    PLAN:  Treatment Recommendations: Strengthening, Range of Motion, Manual Therapy - Soft Tissue Mobilization, Manual Therapy - Joint Manipulation, Pain Management, Home Exercise Program, Patient Education and Modalities    []  Plan of care initiated. Plan to see patient 2 times per week for 8 weeks to address the treatment planned outlined above.   [x]  Continue with current plan of care  []  Modify plan of care as follows:    []  Hold pending physician visit  []  Discharge    Time In 0830   Time out  0905   Timed Code Minutes: 35   Total Treatment Time: 35       Electronically Signed by: Nahun Patterson

## 2020-10-12 ENCOUNTER — HOSPITAL ENCOUNTER (OUTPATIENT)
Dept: PHYSICAL THERAPY | Age: 60
Setting detail: THERAPIES SERIES
Discharge: HOME | End: 2020-10-12
Payer: MEDICARE

## 2020-10-12 PROCEDURE — 97124 MASSAGE THERAPY: CPT

## 2020-10-12 PROCEDURE — 97110 THERAPEUTIC EXERCISES: CPT

## 2020-10-12 NOTE — PROGRESS NOTES
[address] [facility]  PHYSICAL THERAPY  [] EVALUATION  [x] DAILY NOTE (LAND) [] DAILY NOTE (AQUATIC ) [] PROGRESS NOTE [] DISCHARGE NOTE    [x] OUTPATIENT REHABILITATION CENTER [facility]   [] [place] [address]    [] [facility]   [] [facility]    Date: 10/12/2020  Patient Name:  Con Jimenes  : 1960  MRN: [id number]    Referring Practitioner LEIGHA Montanez - *   Diagnosis Cervicalgia [M54.2]    Treatment Diagnosis Neck and left shoulder pain    Date of Evaluation 10/5/20    Additional Pertinent History Fibromyalgia, post open heart surgery 6 years ago,       Functional Outcome Measure Used  Oswestry NDI     Functional Outcome Score     13 (10/5/20)       Insurance: Primary: Payor: Srini Gabriel /  /  / ,   Secondary:    Authorization Information: $40 copay per visit    Visit # 3/3/10 for progress note   Visits Allowed:    Recertification Date: 8 weeks,(2020)   Physician Follow-Up: Unknown    Physician Orders:    History of Present Illness: Pain since April or May after doing yardwork. Digging up bricks and carrying themin the left arm and readjusted -- moderate increased pain in the left neck and a to elbow and chest. She has had chest pain since heart surgery about 6 years ago. SUBJECTIVE:  Golfed 3 times last week, muscles sore , but no increased pain in the neck or shoulder. TREATMENT   Precautions:    Pain:     X in shaded column indicates activity completed today   Modalities Parameters/  Location  Notes                     Manual Therapy Time/Technique  Notes   SO release, manual CT  8 min   x Relief in headache and tightness after SO release.                 Exercise/Intervention   Notes   T band ret, ext abd, ER, IR, flex , scap squeeze, biceps Orange 20 reps fatigue after   X                                                                             Specific Interventions Next Treatment: SO release, stretching, add scap strengthening, use free weight  Next visit,      Activity/Treatment Tolerance:  [x]  Patient tolerated treatment well  []  Patient limited by fatigue  []  Patient limited by pain   []  Patient limited by medical complications  []  Other:     Assessment:less pain overall still avoiding pulling with her left arm    Body Structures/Functions/Activity Limitations: impaired muscle tone, impaired ROM, impaired strength, pain and decreased posture  Prognosis: good    GOALS:  Patient Goal: less pain , turn head better      Short Term Goals:  Time Frame: 4 weeks  Increase CROM to 75% of normal  Increase scap strength to 4+ and left shoulder strength to4/5  Decrease pain to less than 5/10 when present      Long Term Goals:  Time Frame: 8 weeks  Increase CROM to [facility] without increased pain to vie a car without compensation  Increase B shoulder and scap strength to 5/5 so patient can reach overhead without increased neck pain   I HEP      Patient Education:   [x]  HEP/Education Completed: Plan of Care, Goals, shoulder rolls.  Bycler Access Code:  []  No new Education completed  []  Reviewed Prior HEP      []  Patient verbalized and/or demonstrated understanding of education provided. []  Patient unable to verbalize and/or demonstrate understanding of education provided. Will continue education. [x]  Barriers to learning: none    PLAN:  Treatment Recommendations: Strengthening, Range of Motion, Manual Therapy - Soft Tissue Mobilization, Manual Therapy - Joint Manipulation, Pain Management, Home Exercise Program, Patient Education and Modalities    []  Plan of care initiated. Plan to see patient 2 times per week for 8 weeks to address the treatment planned outlined above.   [x]  Continue with current plan of care  []  Modify plan of care as follows:    []  Hold pending physician visit  []  Discharge    Time In 1030   Time out  1105   Timed Code Minutes: 35   Total Treatment Time: 35

## 2020-10-15 ENCOUNTER — HOSPITAL ENCOUNTER (OUTPATIENT)
Dept: PHYSICAL THERAPY | Age: 60
Setting detail: THERAPIES SERIES
Discharge: HOME | End: 2020-10-15
Payer: MEDICARE

## 2020-10-15 PROCEDURE — 97140 MANUAL THERAPY 1/> REGIONS: CPT

## 2020-10-15 PROCEDURE — 97110 THERAPEUTIC EXERCISES: CPT

## 2020-10-15 RX ORDER — ATORVASTATIN CALCIUM 80 MG/1
80 TABLET, FILM COATED ORAL DAILY
Qty: 90 TABLET | Refills: 3 | Status: SHIPPED | OUTPATIENT
Start: 2020-10-15 | End: 2021-10-08

## 2020-10-19 ENCOUNTER — HOSPITAL ENCOUNTER (OUTPATIENT)
Dept: PHYSICAL THERAPY | Age: 60
Setting detail: THERAPIES SERIES
Discharge: HOME | End: 2020-10-19
Payer: MEDICARE

## 2020-10-19 PROCEDURE — 97035 APP MDLTY 1+ULTRASOUND EA 15: CPT

## 2020-10-19 NOTE — PROGRESS NOTES
[address] [facility]  PHYSICAL THERAPY  [] EVALUATION  [x] DAILY NOTE (LAND) [] DAILY NOTE (AQUATIC ) [] PROGRESS NOTE [] DISCHARGE NOTE    [x] OUTPATIENT REHABILITATION CENTER - [place]   [] [place] [address]    [] [facility]   [] [facility]    Date: 10/19/2020  Patient Name:  Dexter Reed  : 1960  MRN: [id number]    Referring Practitioner LEIGHA Cabrales - *   Diagnosis Cervicalgia [M54.2]    Treatment Diagnosis Neck and left shoulder pain    Date of Evaluation 10/5/20    Additional Pertinent History Fibromyalgia, post open heart surgery 6 years ago,       Functional Outcome Measure Used  Oswestry NDI     Functional Outcome Score     13 (10/5/20)       Insurance: Primary: Payor: Srini Gabriel /  /  / ,   Secondary:    Authorization Information: $40 copay per visit    Visit # 4,4/10 for progress note   Visits Allowed:    Recertification Date: 8 weeks,(2020)   Physician Follow-Up: Unknown    Physician Orders:    History of Present Illness: Pain since April or May after doing yardwork. Digging up bricks and carrying themin the left arm and readjusted -- moderate increased pain in the left neck and a to elbow and chest. She has had chest pain since heart surgery about 6 years ago. SUBJECTIVE:  Pt did not state she had less pain after last visit, but no increased symptoms either. Se has not noticed a difference in pain overall. US today  As trial for pain releif. She reported mild irritation with use of T band.      TREATMENT   Precautions:    Pain: 3-4/10  Left neck       X in shaded column indicates activity completed today   Modalities Parameters/  Location  Notes   Cold pack 8 min after   Left side of neck       US  Left upper cerivcal and SO region  1.4 cont 8 min  x          Manual Therapy   Time/Technique  Notes   SO release, manual CT   No prone massage  8 min    Relief in headache and tightness after SO release. Exercise/Intervention   Notes   T band ret, ext abd, ER, IR, flex , scap squeeze, biceps Orange 20 reps fatigue after                                                                               Specific Interventions Next Treatment: cont with US if helpful   SO release, stretching, add scap strengthening,  Cont with  Gentle supine stretching. Activity/Treatment Tolerance:  [x]  Patient tolerated treatment well  []  Patient limited by fatigue  []  Patient limited by pain   []  Patient limited by medical complications  []  Other:     Assessment:no inmmediate pain relief after US . Body Structures/Functions/Activity Limitations: impaired muscle tone, impaired ROM, impaired strength, pain and decreased posture  Prognosis: good    GOALS:  Patient Goal: less pain , turn head better      Short Term Goals:  Time Frame: 4 weeks  Increase CROM to 75% of normal  Increase scap strength to 4+ and left shoulder strength to4/5  Decrease pain to less than 5/10 when present      Long Term Goals:  Time Frame: 8 weeks  Increase CROM to [facility] without increased pain to vie a car without compensation  Increase B shoulder and scap strength to 5/5 so patient can reach overhead without increased neck pain   I HEP      Patient Education:   [x]  HEP/Education Completed: Plan of Care, Goals, shoulder rolls.  Medbridge Access Code:  []  No new Education completed  []  Reviewed Prior HEP      []  Patient verbalized and/or demonstrated understanding of education provided. []  Patient unable to verbalize and/or demonstrate understanding of education provided. Will continue education. [x]  Barriers to learning: none    PLAN:  Treatment Recommendations: Strengthening, Range of Motion, Manual Therapy - Soft Tissue Mobilization, Manual Therapy - Joint Manipulation, Pain Management, Home Exercise Program, Patient Education and Modalities    []  Plan of care initiated.   Plan to see patient 2 times per week for 8

## 2020-10-22 ENCOUNTER — HOSPITAL ENCOUNTER (OUTPATIENT)
Dept: PHYSICAL THERAPY | Age: 60
Setting detail: THERAPIES SERIES
Discharge: HOME | End: 2020-10-22
Payer: MEDICARE

## 2020-10-22 PROCEDURE — 97010 HOT OR COLD PACKS THERAPY: CPT

## 2020-10-22 PROCEDURE — 97110 THERAPEUTIC EXERCISES: CPT

## 2020-10-22 NOTE — PROGRESS NOTES
[address] [facility]  PHYSICAL THERAPY  [] EVALUATION  [x] DAILY NOTE (LAND) [] DAILY NOTE (AQUATIC ) [] PROGRESS NOTE [] DISCHARGE NOTE    [x] OUTPATIENT REHABILITATION [facility]   [] [place] [address]    [] [facility]   [facility]    Date: 10/22/2020  Patient Name:  Reji Zaidi  : 1960  MRN: [id number]    Referring Practitioner LEIGHA Martinez - *   Diagnosis Cervicalgia [M54.2]    Treatment Diagnosis Neck and left shoulder pain    Date of Evaluation 10/5/20    Additional Pertinent History Fibromyalgia, post open heart surgery 6 years ago,       Functional Outcome Measure Used  Oswestry NDI     Functional Outcome Score     13 (10/5/20)       Insurance: Primary: Payor: Opternativee Stain /  /  / ,   Secondary:    Authorization Information: $40 copay per visit    Visit # 5,5/10 for progress note   Visits Allowed:    Recertification Date: 8 weeks,(2020)   Physician Follow-Up: Unknown    Physician Orders:    History of Present Illness: Pain since April or May after doing yardwork. Digging up bricks and carrying themin the left arm and readjusted -- moderate increased pain in the left neck and a to elbow and chest. She has had chest pain since heart surgery about 6 years ago. SUBJECTIVE:  Bad night after US , she reported severe pain and headache after lasting most the night. Pain in the left side of the neck at SO region, and into the left mandible. Several questions of test to eval for what the problem is in her neck and discussed MRI could show soft tissue inflammation and damage.   discussed current plan to strengthen smaller muscle , then work toward larger muscles   TREATMENT   Precautions:    Pain: 1 prior to exercise, 2/10  In the left neck and side of face     X in shaded column indicates activity completed today   Modalities Parameters/  Location  Notes   Cold pack 8 min after   Left side of neck   x US  Left upper cerivcal and SO region  1.4 cont 8 min   Discontinue , flair up after done          Manual Therapy   Time/Technique  Notes   SO release, manual CT   No prone massage  8 min    Relief in headache and tightness after SO release. Exercise/Intervention   Notes   T band ret, ext  ER, IR, flex , scap squeeze Orange 20 reps fatigue after   x                         Add pull downs  Next visit. Corner stretch, shoulder level and overhead 5 5 sec  x                                         Specific Interventions Next Treatment:  ,  Discontinue US  and cont with Exercise and ice    Activity/Treatment Tolerance:  [x]  Patient tolerated treatment well  []  Patient limited by fatigue  []  Patient limited by pain   []  Patient limited by medical complications  []  Other:     Assessment:pat reports increased pain a few hours after therapy-- but no immediate increase after any activity. Body Structures/Functions/Activity Limitations: impaired muscle tone, impaired ROM, impaired strength, pain and decreased posture  Prognosis: good    GOALS:  Patient Goal: less pain , turn head better      Short Term Goals:  Time Frame: 4 weeks  Increase CROM to 75% of normal  Increase scap strength to 4+ and left shoulder strength to4/5  Decrease pain to less than 5/10 when present      Long Term Goals:  Time Frame: 8 weeks  Increase CROM to [facility] without increased pain to vie a car without compensation  Increase B shoulder and scap strength to 5/5 so patient can reach overhead without increased neck pain   I HEP      Patient Education:   [x]  HEP/Education Completed: Plan of Care, Goals, shoulder rolls.  Medbridge Access Code:  []  No new Education completed  []  Reviewed Prior HEP      []  Patient verbalized and/or demonstrated understanding of education provided. []  Patient unable to verbalize and/or demonstrate understanding of education provided. Will continue education.   [x]  Barriers to learning: none    PLAN:  Treatment Recommendations: Strengthening, Range of Motion, Manual Therapy - Soft Tissue Mobilization, Manual Therapy - Joint Manipulation, Pain Management, Home Exercise Program, Patient Education and Modalities    []  Plan of care initiated. Plan to see patient 2 times per week for 8 weeks to address the treatment planned outlined above.   [x]  Continue with current plan of care  []  Modify plan of care as follows:    []  Hold pending physician visit  []  Discharge    Time In [id number]   Time out  1013   Timed Code Minutes: 15   Total Treatment Time: 30       Electronically Signed by: Lisy Escalante

## 2020-10-26 ENCOUNTER — HOSPITAL ENCOUNTER (OUTPATIENT)
Dept: PHYSICAL THERAPY | Age: 60
Setting detail: THERAPIES SERIES
Discharge: HOME | End: 2020-10-26
Payer: MEDICARE

## 2020-10-26 PROCEDURE — 97010 HOT OR COLD PACKS THERAPY: CPT

## 2020-10-26 PROCEDURE — 97110 THERAPEUTIC EXERCISES: CPT

## 2020-10-26 NOTE — PROGRESS NOTES
[address] [facility]  PHYSICAL THERAPY  [] EVALUATION  [x] DAILY NOTE (LAND) [] DAILY NOTE (AQUATIC ) [] PROGRESS NOTE [] DISCHARGE NOTE    [x] OUTPATIENT REHABILITATION CENTER [facility]   [] [place] [address]    [] [facility]   [] [facility]    Date: 10/26/2020  Patient Name:  Akosua Ledbetter  : 1960  MRN: [id number]    Referring Practitioner LEIGHA German - *   Diagnosis Cervicalgia [M54.2]    Treatment Diagnosis Neck and left shoulder pain    Date of Evaluation 10/5/20    Additional Pertinent History Fibromyalgia, post open heart surgery 6 years ago,       Functional Outcome Measure Used  Oswestry NDI     Functional Outcome Score     13 (10/5/20)       Insurance: Primary: Payor: Srini Gabriel /  /  / ,   Secondary:    Authorization Information: $40 copay per visit    Visit # 5,5/10 for progress note   Visits Allowed:    Recertification Date: 8 weeks,(2020)   Physician Follow-Up: Unknown    Physician Orders:    History of Present Illness: Pain since April or May after doing yardwork. Digging up bricks and carrying themin the left arm and readjusted -- moderate increased pain in the left neck and a to elbow and chest. She has had chest pain since heart surgery about 6 years ago. SUBJECTIVE:   Dull ache at back of neck, only 1 headache over the weekend. Did well after last visit,-- cont with only adding 5 reps and pull downs. TREATMENT   Precautions:    Pain: 1 prior to exercise, 2/10  In the left neck and side of face     X in shaded column indicates activity completed today   Modalities Parameters/  Location  Notes   Cold pack 8 min after   Left side of neck   x    US  Left upper cerivcal and SO region  1.4 cont 8 min   Discontinue , flair up after done          Manual Therapy   Time/Technique  Notes   SO release, manual CT   No prone massage  8 min    Relief in headache and tightness after SO release. Exercise/Intervention   Notes   T band ret, ext  ER, IR, flex , scap squeeze Orange 25 reps fatigue after   x    Pull downs and lat pull downs with orange band up high  15  x                         Corner stretch, shoulder level and overhead 5 5 sec                                           Specific Interventions Next Treatment:  ,  Discontinue US  and cont with Exercise and ice    Activity/Treatment Tolerance:  [x]  Patient tolerated treatment well  []  Patient limited by fatigue  []  Patient limited by pain   []  Patient limited by medical complications  []  Other:     Assessment: mild increase ache after exercise. Cont with Ice immediately. Body Structures/Functions/Activity Limitations: impaired muscle tone, impaired ROM, impaired strength, pain and decreased posture  Prognosis: good    GOALS:  Patient Goal: less pain , turn head better      Short Term Goals:  Time Frame: 4 weeks  Increase CROM to 75% of normal  Increase scap strength to 4+ and left shoulder strength to4/5  Decrease pain to less than 5/10 when present      Long Term Goals:  Time Frame: 8 weeks  Increase CROM to [facility] without increased pain to vie a car without compensation  Increase B shoulder and scap strength to 5/5 so patient can reach overhead without increased neck pain   I HEP      Patient Education:   [x]  HEP/Education Completed: Plan of Care, Goals, shoulder rolls.  Medbridge Access Code:  []  No new Education completed  []  Reviewed Prior HEP      []  Patient verbalized and/or demonstrated understanding of education provided. []  Patient unable to verbalize and/or demonstrate understanding of education provided. Will continue education. [x]  Barriers to learning: none    PLAN:  Treatment Recommendations: Strengthening, Range of Motion, Manual Therapy - Soft Tissue Mobilization, Manual Therapy - Joint Manipulation, Pain Management, Home Exercise Program, Patient Education and Modalities    []  Plan of care initiated. Plan to see patient 2 times per week for 8 weeks to address the treatment planned outlined above.   [x]  Continue with current plan of care  []  Modify plan of care as follows:    []  Hold pending physician visit  []  Discharge    Time In [id number]   Time out  1016   Timed Code Minutes: 15   Total Treatment Time: 30       Electronically Signed by: Grabiel Duarte

## 2020-10-29 ENCOUNTER — HOSPITAL ENCOUNTER (OUTPATIENT)
Dept: PHYSICAL THERAPY | Age: 60
Setting detail: THERAPIES SERIES
Discharge: HOME | End: 2020-10-29
Payer: MEDICARE

## 2020-10-29 PROCEDURE — 97110 THERAPEUTIC EXERCISES: CPT

## 2020-10-29 NOTE — DISCHARGE SUMMARY
[address] [facility]  PHYSICAL THERAPY  [] EVALUATION  [] DAILY NOTE (LAND) [] DAILY NOTE (AQUATIC ) [] PROGRESS NOTE [x] DISCHARGE NOTE    [x] OUTPATIENT REHABILITATION CENTER [facility]   [] [place] [address]    [] [facility]   [] Ashanti Duran    Date: 10/29/2020  Patient Name:  Charles Olivia  : 1960  MRN: [id number]    Referring Practitioner LEIGHA Saxena - *   Diagnosis Cervicalgia [M54.2]    Treatment Diagnosis Neck and left shoulder pain    Date of Evaluation 10/5/20    Additional Pertinent History Fibromyalgia, post open heart surgery 6 years ago,       Functional Outcome Measure Used  Oswestry NDI     Functional Outcome Score     13 (10/5/20)   8 At discharge       Insurance: Primary: Payor: Teresa Simon /  /  / ,   Secondary:    Authorization Information: $40 copay per visit    Visit # 6,6/10 for progress note   Visits Allowed:    Recertification Date: 8 weeks,(2020)   Physician Follow-Up: Unknown    Physician Orders:    History of Present Illness: Pain since April or May after doing yardwork. Digging up bricks and carrying themin the left arm and readjusted -- moderate increased pain in the left neck and a to elbow and chest. She has had chest pain since heart surgery about 6 years ago. SUBJECTIVE:  More pain after any activity with raising arms higher than her shoulders-- Now performing HEP with arms lower than shoulder height for strengthening without flair up in pain after.   She feel confortable continuing with a HEP     CROM rotation and SB B within normal limits with compression on the left  Flex end range pull posterior  Left shoulder and scap strength 5/5    TREATMENT   Precautions:    Pain: 1 prior to exercise, 2/10  In the left neck and side of face     X in shaded column indicates activity completed today   Modalities Parameters/  Location  Notes   Cold pack 8 min after   Left side of neck     at No new Education completed  [x]  Reviewed Prior HEP      []  Patient verbalized and/or demonstrated understanding of education provided. []  Patient unable to verbalize and/or demonstrate understanding of education provided. Will continue education. [x]  Barriers to learning: none    PLAN:  Treatment Recommendations: Strengthening, Range of Motion, Manual Therapy - Soft Tissue Mobilization, Manual Therapy - Joint Manipulation, Pain Management, Home Exercise Program, Patient Education and Modalities    []  Plan of care initiated. Plan to see patient 2 times per week for 8 weeks to address the treatment planned outlined above.   [x]  Continue with current plan of care  []  Modify plan of care as follows:    []  Hold pending physician visit  []  Discharge    Time In 0932   Time out  1006   Timed Code Minutes: 32   Total Treatment Time: 32       Electronically Signed by: Tiffanie Arellano

## 2020-10-30 NOTE — TELEPHONE ENCOUNTER
Akosua Ledbetter called requesting a refill on the following medications:  Requested Prescriptions     Pending Prescriptions Disp Refills    HYDROcodone-acetaminophen (NORCO) 5-325 MG per tablet        Sig: Take 1 tablet by mouth every 6 hours as needed for Pain. Pharmacy verified:[facility]  . pv      Date of last visit: 9/30/20  Date of next visit (if applicable): 15/2/6907

## 2020-11-02 RX ORDER — HYDROCODONE BITARTRATE AND ACETAMINOPHEN 5; 325 MG/1; MG/1
1 TABLET ORAL EVERY 8 HOURS PRN
Qty: 90 TABLET | Refills: 0 | Status: CANCELLED | OUTPATIENT
Start: 2020-11-02 | End: 2020-12-02

## 2020-11-02 NOTE — TELEPHONE ENCOUNTER
OARRS reviewed. UDS: + for  Norco, xanax,   Amphetamine - inconsistent, Methamphetamine also + on 06/2020 Screen   Patient coming in to re-screen today. States she does not take any medications that would show + for amphetamine. Dorie Sharma from [facility] to call lab to verify results. Last seen: 9/23/2020.      Follow-up:   Future Appointments   Date Time Provider Rasheeda Pat   11/4/2020  8:40 AM LEIGHA Carvalho - CNP [facility] Pain [facility] [place]   6/28/2021 12:00 PM Alex Rowell MD [address] [facility] [place]

## 2020-11-03 ENCOUNTER — APPOINTMENT (OUTPATIENT)
Dept: GENERAL RADIOLOGY | Age: 60
DRG: 287 | End: 2020-11-03
Payer: MEDICARE

## 2020-11-03 ENCOUNTER — HOSPITAL ENCOUNTER (OUTPATIENT)
Age: 60
Setting detail: OBSERVATION
Discharge: ANOTHER HOSPITAL | DRG: 287 | End: 2020-11-05
Attending: EMERGENCY MEDICINE | Admitting: FAMILY MEDICINE
Payer: MEDICARE

## 2020-11-03 ENCOUNTER — APPOINTMENT (OUTPATIENT)
Dept: CARDIAC CATH/INVASIVE PROCEDURES | Age: 60
DRG: 287 | End: 2020-11-03
Payer: MEDICARE

## 2020-11-03 PROBLEM — R07.9 CHEST PAIN: Status: ACTIVE | Noted: 2020-11-03

## 2020-11-03 LAB
ANION GAP SERPL CALCULATED.3IONS-SCNC: 12 MEQ/L (ref 8–16)
BASOPHILS # BLD: 0.6 %
BASOPHILS ABSOLUTE: 0 THOU/MM3 (ref 0–0.1)
BUN BLDV-MCNC: 17 MG/DL (ref 7–22)
CALCIUM SERPL-MCNC: 9.4 MG/DL (ref 8.5–10.5)
CHLORIDE BLD-SCNC: 101 MEQ/L (ref 98–111)
CHOLESTEROL, TOTAL: 118 MG/DL (ref 100–199)
CO2: 29 MEQ/L (ref 23–33)
CREAT SERPL-MCNC: 1 MG/DL (ref 0.4–1.2)
EKG ATRIAL RATE: 72 BPM
EKG P AXIS: 50 DEGREES
EKG P-R INTERVAL: 154 MS
EKG Q-T INTERVAL: 402 MS
EKG QRS DURATION: 76 MS
EKG QTC CALCULATION (BAZETT): 440 MS
EKG R AXIS: 10 DEGREES
EKG T AXIS: 67 DEGREES
EKG VENTRICULAR RATE: 72 BPM
EOSINOPHIL # BLD: 2.1 %
EOSINOPHILS ABSOLUTE: 0.2 THOU/MM3 (ref 0–0.4)
ERYTHROCYTE [DISTWIDTH] IN BLOOD BY AUTOMATED COUNT: 12.3 % (ref 11.5–14.5)
ERYTHROCYTE [DISTWIDTH] IN BLOOD BY AUTOMATED COUNT: 44 FL (ref 35–45)
GFR SERPL CREATININE-BSD FRML MDRD: 56 ML/MIN/1.73M2
GLUCOSE BLD-MCNC: 129 MG/DL (ref 70–108)
HCT VFR BLD CALC: 41.9 % (ref 37–47)
HDLC SERPL-MCNC: 56 MG/DL
HEMOGLOBIN: 14 GM/DL (ref 12–16)
IMMATURE GRANS (ABS): 0.02 THOU/MM3 (ref 0–0.07)
IMMATURE GRANULOCYTES: 0.3 %
LDL CHOLESTEROL CALCULATED: 31 MG/DL
LYMPHOCYTES # BLD: 45.2 %
LYMPHOCYTES ABSOLUTE: 3.3 THOU/MM3 (ref 1–4.8)
MCH RBC QN AUTO: 32.6 PG (ref 26–33)
MCHC RBC AUTO-ENTMCNC: 33.4 GM/DL (ref 32.2–35.5)
MCV RBC AUTO: 97.4 FL (ref 81–99)
MONOCYTES # BLD: 10.5 %
MONOCYTES ABSOLUTE: 0.8 THOU/MM3 (ref 0.4–1.3)
NUCLEATED RED BLOOD CELLS: 0 /100 WBC
OSMOLALITY CALCULATION: 286.4 MOSMOL/KG (ref 275–300)
PLATELET # BLD: 282 THOU/MM3 (ref 130–400)
PMV BLD AUTO: 9.6 FL (ref 9.4–12.4)
POTASSIUM REFLEX MAGNESIUM: 3.6 MEQ/L (ref 3.5–5.2)
PRO-BNP: 97.3 PG/ML (ref 0–900)
RBC # BLD: 4.3 MILL/MM3 (ref 4.2–5.4)
SARS-COV-2, NAAT: NOT DETECTED
SEG NEUTROPHILS: 41.3 %
SEGMENTED NEUTROPHILS ABSOLUTE COUNT: 3 THOU/MM3 (ref 1.8–7.7)
SODIUM BLD-SCNC: 142 MEQ/L (ref 135–145)
TRIGL SERPL-MCNC: 154 MG/DL (ref 0–199)
TROPONIN T: < 0.01 NG/ML
TROPONIN T: < 0.01 NG/ML
WBC # BLD: 7.2 THOU/MM3 (ref 4.8–10.8)

## 2020-11-03 PROCEDURE — 93459 L HRT ART/GRFT ANGIO: CPT | Performed by: INTERNAL MEDICINE

## 2020-11-03 PROCEDURE — 6360000002 HC RX W HCPCS

## 2020-11-03 PROCEDURE — 36415 COLL VENOUS BLD VENIPUNCTURE: CPT

## 2020-11-03 PROCEDURE — 80048 BASIC METABOLIC PNL TOTAL CA: CPT

## 2020-11-03 PROCEDURE — 6370000000 HC RX 637 (ALT 250 FOR IP): Performed by: NURSE PRACTITIONER

## 2020-11-03 PROCEDURE — 84484 ASSAY OF TROPONIN QUANT: CPT

## 2020-11-03 PROCEDURE — 93005 ELECTROCARDIOGRAM TRACING: CPT | Performed by: EMERGENCY MEDICINE

## 2020-11-03 PROCEDURE — 80061 LIPID PANEL: CPT

## 2020-11-03 PROCEDURE — G0378 HOSPITAL OBSERVATION PER HR: HCPCS

## 2020-11-03 PROCEDURE — 99220 PR INITIAL OBSERVATION CARE/DAY 70 MINUTES: CPT | Performed by: NURSE PRACTITIONER

## 2020-11-03 PROCEDURE — 94760 N-INVAS EAR/PLS OXIMETRY 1: CPT

## 2020-11-03 PROCEDURE — U0002 COVID-19 LAB TEST NON-CDC: HCPCS

## 2020-11-03 PROCEDURE — C1894 INTRO/SHEATH, NON-LASER: HCPCS

## 2020-11-03 PROCEDURE — 2709999900 HC NON-CHARGEABLE SUPPLY

## 2020-11-03 PROCEDURE — 71045 X-RAY EXAM CHEST 1 VIEW: CPT

## 2020-11-03 PROCEDURE — 93010 ELECTROCARDIOGRAM REPORT: CPT | Performed by: INTERNAL MEDICINE

## 2020-11-03 PROCEDURE — 6370000000 HC RX 637 (ALT 250 FOR IP): Performed by: EMERGENCY MEDICINE

## 2020-11-03 PROCEDURE — 6360000004 HC RX CONTRAST MEDICATION: Performed by: INTERNAL MEDICINE

## 2020-11-03 PROCEDURE — C1769 GUIDE WIRE: HCPCS

## 2020-11-03 PROCEDURE — 83880 ASSAY OF NATRIURETIC PEPTIDE: CPT

## 2020-11-03 PROCEDURE — 2500000003 HC RX 250 WO HCPCS

## 2020-11-03 PROCEDURE — B2181ZZ FLUOROSCOPY OF LEFT INTERNAL MAMMARY BYPASS GRAFT USING LOW OSMOLAR CONTRAST: ICD-10-PCS | Performed by: INTERNAL MEDICINE

## 2020-11-03 PROCEDURE — 2580000003 HC RX 258: Performed by: NURSE PRACTITIONER

## 2020-11-03 PROCEDURE — 4A023N7 MEASUREMENT OF CARDIAC SAMPLING AND PRESSURE, LEFT HEART, PERCUTANEOUS APPROACH: ICD-10-PCS | Performed by: INTERNAL MEDICINE

## 2020-11-03 PROCEDURE — 85025 COMPLETE CBC W/AUTO DIFF WBC: CPT

## 2020-11-03 PROCEDURE — B2131ZZ FLUOROSCOPY OF MULTIPLE CORONARY ARTERY BYPASS GRAFTS USING LOW OSMOLAR CONTRAST: ICD-10-PCS | Performed by: INTERNAL MEDICINE

## 2020-11-03 PROCEDURE — B2111ZZ FLUOROSCOPY OF MULTIPLE CORONARY ARTERIES USING LOW OSMOLAR CONTRAST: ICD-10-PCS | Performed by: INTERNAL MEDICINE

## 2020-11-03 PROCEDURE — B2151ZZ FLUOROSCOPY OF LEFT HEART USING LOW OSMOLAR CONTRAST: ICD-10-PCS | Performed by: INTERNAL MEDICINE

## 2020-11-03 PROCEDURE — 99223 1ST HOSP IP/OBS HIGH 75: CPT | Performed by: INTERNAL MEDICINE

## 2020-11-03 PROCEDURE — 99284 EMERGENCY DEPT VISIT MOD MDM: CPT

## 2020-11-03 RX ORDER — PRASUGREL 10 MG/1
10 TABLET, FILM COATED ORAL DAILY
Status: DISCONTINUED | OUTPATIENT
Start: 2020-11-03 | End: 2020-11-05 | Stop reason: HOSPADM

## 2020-11-03 RX ORDER — ONDANSETRON 2 MG/ML
4 INJECTION INTRAMUSCULAR; INTRAVENOUS EVERY 6 HOURS PRN
Status: DISCONTINUED | OUTPATIENT
Start: 2020-11-03 | End: 2020-11-05 | Stop reason: HOSPADM

## 2020-11-03 RX ORDER — SODIUM CHLORIDE 0.9 % (FLUSH) 0.9 %
10 SYRINGE (ML) INJECTION PRN
Status: DISCONTINUED | OUTPATIENT
Start: 2020-11-03 | End: 2020-11-05 | Stop reason: HOSPADM

## 2020-11-03 RX ORDER — FUROSEMIDE 20 MG/1
20 TABLET ORAL 2 TIMES DAILY
Status: DISCONTINUED | OUTPATIENT
Start: 2020-11-03 | End: 2020-11-05 | Stop reason: HOSPADM

## 2020-11-03 RX ORDER — ATORVASTATIN CALCIUM 80 MG/1
80 TABLET, FILM COATED ORAL DAILY
Status: DISCONTINUED | OUTPATIENT
Start: 2020-11-03 | End: 2020-11-05 | Stop reason: HOSPADM

## 2020-11-03 RX ORDER — ALPRAZOLAM 0.5 MG/1
1 TABLET ORAL NIGHTLY PRN
Status: DISCONTINUED | OUTPATIENT
Start: 2020-11-03 | End: 2020-11-05 | Stop reason: HOSPADM

## 2020-11-03 RX ORDER — ICOSAPENT ETHYL 1000 MG/1
2 CAPSULE ORAL 2 TIMES DAILY
Status: DISCONTINUED | OUTPATIENT
Start: 2020-11-03 | End: 2020-11-05 | Stop reason: HOSPADM

## 2020-11-03 RX ORDER — POTASSIUM CHLORIDE 7.45 MG/ML
10 INJECTION INTRAVENOUS PRN
Status: DISCONTINUED | OUTPATIENT
Start: 2020-11-03 | End: 2020-11-05 | Stop reason: HOSPADM

## 2020-11-03 RX ORDER — ACETAMINOPHEN 650 MG/1
650 SUPPOSITORY RECTAL EVERY 6 HOURS PRN
Status: DISCONTINUED | OUTPATIENT
Start: 2020-11-03 | End: 2020-11-05 | Stop reason: HOSPADM

## 2020-11-03 RX ORDER — HYDROCODONE BITARTRATE AND ACETAMINOPHEN 5; 325 MG/1; MG/1
1 TABLET ORAL EVERY 6 HOURS PRN
Status: DISCONTINUED | OUTPATIENT
Start: 2020-11-03 | End: 2020-11-05 | Stop reason: HOSPADM

## 2020-11-03 RX ORDER — SODIUM CHLORIDE 0.9 % (FLUSH) 0.9 %
10 SYRINGE (ML) INJECTION EVERY 12 HOURS SCHEDULED
Status: DISCONTINUED | OUTPATIENT
Start: 2020-11-03 | End: 2020-11-05 | Stop reason: HOSPADM

## 2020-11-03 RX ORDER — NITROGLYCERIN 0.4 MG/1
0.4 TABLET SUBLINGUAL EVERY 5 MIN PRN
Status: DISCONTINUED | OUTPATIENT
Start: 2020-11-03 | End: 2020-11-05 | Stop reason: HOSPADM

## 2020-11-03 RX ORDER — ASPIRIN 81 MG/1
81 TABLET, CHEWABLE ORAL DAILY
Status: DISCONTINUED | OUTPATIENT
Start: 2020-11-04 | End: 2020-11-05 | Stop reason: HOSPADM

## 2020-11-03 RX ORDER — ASPIRIN 81 MG/1
324 TABLET, CHEWABLE ORAL ONCE
Status: COMPLETED | OUTPATIENT
Start: 2020-11-03 | End: 2020-11-03

## 2020-11-03 RX ORDER — ISOSORBIDE MONONITRATE 60 MG/1
60 TABLET, EXTENDED RELEASE ORAL DAILY
Status: DISCONTINUED | OUTPATIENT
Start: 2020-11-03 | End: 2020-11-05 | Stop reason: HOSPADM

## 2020-11-03 RX ORDER — DULOXETIN HYDROCHLORIDE 30 MG/1
30 CAPSULE, DELAYED RELEASE ORAL DAILY
Status: DISCONTINUED | OUTPATIENT
Start: 2020-11-03 | End: 2020-11-03

## 2020-11-03 RX ORDER — POTASSIUM CHLORIDE 20 MEQ/1
40 TABLET, EXTENDED RELEASE ORAL PRN
Status: DISCONTINUED | OUTPATIENT
Start: 2020-11-03 | End: 2020-11-05 | Stop reason: HOSPADM

## 2020-11-03 RX ORDER — ACETAMINOPHEN 325 MG/1
650 TABLET ORAL EVERY 6 HOURS PRN
Status: DISCONTINUED | OUTPATIENT
Start: 2020-11-03 | End: 2020-11-05 | Stop reason: HOSPADM

## 2020-11-03 RX ORDER — ASCORBIC ACID 500 MG
1000 TABLET ORAL DAILY
Status: DISCONTINUED | OUTPATIENT
Start: 2020-11-03 | End: 2020-11-05 | Stop reason: HOSPADM

## 2020-11-03 RX ORDER — MAGNESIUM SULFATE IN WATER 40 MG/ML
2 INJECTION, SOLUTION INTRAVENOUS PRN
Status: DISCONTINUED | OUTPATIENT
Start: 2020-11-03 | End: 2020-11-05 | Stop reason: HOSPADM

## 2020-11-03 RX ORDER — MV-MIN/FOLIC/VIT K/LYCOP/COQ10 200-100MCG
360 CAPSULE ORAL 2 TIMES DAILY
Status: ON HOLD | COMMUNITY
End: 2020-11-03

## 2020-11-03 RX ORDER — HYDROXYZINE HYDROCHLORIDE 10 MG/1
10 TABLET, FILM COATED ORAL 3 TIMES DAILY PRN
Status: DISCONTINUED | OUTPATIENT
Start: 2020-11-03 | End: 2020-11-05 | Stop reason: HOSPADM

## 2020-11-03 RX ORDER — CYCLOBENZAPRINE HCL 10 MG
10 TABLET ORAL 3 TIMES DAILY PRN
Status: DISCONTINUED | OUTPATIENT
Start: 2020-11-03 | End: 2020-11-05 | Stop reason: HOSPADM

## 2020-11-03 RX ORDER — PROMETHAZINE HYDROCHLORIDE 25 MG/1
12.5 TABLET ORAL EVERY 6 HOURS PRN
Status: DISCONTINUED | OUTPATIENT
Start: 2020-11-03 | End: 2020-11-05 | Stop reason: HOSPADM

## 2020-11-03 RX ORDER — ACETAMINOPHEN 160 MG
5000 TABLET,DISINTEGRATING ORAL DAILY
COMMUNITY

## 2020-11-03 RX ORDER — LANOLIN ALCOHOL/MO/W.PET/CERES
1000 CREAM (GRAM) TOPICAL DAILY
Status: DISCONTINUED | OUTPATIENT
Start: 2020-11-03 | End: 2020-11-05 | Stop reason: HOSPADM

## 2020-11-03 RX ADMIN — FUROSEMIDE 20 MG: 20 TABLET ORAL at 08:59

## 2020-11-03 RX ADMIN — METOPROLOL TARTRATE 25 MG: 25 TABLET, FILM COATED ORAL at 20:16

## 2020-11-03 RX ADMIN — ATORVASTATIN CALCIUM 80 MG: 80 TABLET, FILM COATED ORAL at 09:00

## 2020-11-03 RX ADMIN — FUROSEMIDE 20 MG: 20 TABLET ORAL at 20:16

## 2020-11-03 RX ADMIN — ALPRAZOLAM 1 MG: 0.5 TABLET ORAL at 21:21

## 2020-11-03 RX ADMIN — HYDROCODONE BITARTRATE AND ACETAMINOPHEN 1 TABLET: 5; 325 TABLET ORAL at 09:04

## 2020-11-03 RX ADMIN — PRASUGREL 10 MG: 10 TABLET, FILM COATED ORAL at 09:00

## 2020-11-03 RX ADMIN — Medication 1000 MCG: at 08:59

## 2020-11-03 RX ADMIN — Medication 10 ML: at 09:05

## 2020-11-03 RX ADMIN — ACETAMINOPHEN 650 MG: 325 TABLET ORAL at 13:50

## 2020-11-03 RX ADMIN — OXYCODONE HYDROCHLORIDE AND ACETAMINOPHEN 1000 MG: 500 TABLET ORAL at 08:59

## 2020-11-03 RX ADMIN — ACETAMINOPHEN 650 MG: 325 TABLET ORAL at 20:16

## 2020-11-03 RX ADMIN — HYDROCODONE BITARTRATE AND ACETAMINOPHEN 1 TABLET: 5; 325 TABLET ORAL at 17:07

## 2020-11-03 RX ADMIN — ASPIRIN 324 MG: 81 TABLET, CHEWABLE ORAL at 00:48

## 2020-11-03 RX ADMIN — ISOSORBIDE MONONITRATE 60 MG: 60 TABLET ORAL at 08:59

## 2020-11-03 RX ADMIN — IOPAMIDOL 60 ML: 755 INJECTION, SOLUTION INTRAVENOUS at 16:38

## 2020-11-03 RX ADMIN — HYDROCODONE BITARTRATE AND ACETAMINOPHEN 1 TABLET: 5; 325 TABLET ORAL at 23:16

## 2020-11-03 RX ADMIN — METOPROLOL TARTRATE 25 MG: 25 TABLET, FILM COATED ORAL at 08:59

## 2020-11-03 ASSESSMENT — ENCOUNTER SYMPTOMS
VOMITING: 0
CONSTIPATION: 0
BACK PAIN: 0
SORE THROAT: 0
SINUS PRESSURE: 0
RHINORRHEA: 0
NAUSEA: 0
DIARRHEA: 0
EYE REDNESS: 0
ABDOMINAL DISTENTION: 0
ABDOMINAL PAIN: 0
SHORTNESS OF BREATH: 1
WHEEZING: 0
CHEST TIGHTNESS: 0

## 2020-11-03 ASSESSMENT — PAIN DESCRIPTION - PAIN TYPE
TYPE: ACUTE PAIN
TYPE: ACUTE PAIN
TYPE: CHRONIC PAIN
TYPE: ACUTE PAIN
TYPE: CHRONIC PAIN

## 2020-11-03 ASSESSMENT — PAIN DESCRIPTION - PROGRESSION
CLINICAL_PROGRESSION: NOT CHANGED

## 2020-11-03 ASSESSMENT — PAIN DESCRIPTION - DESCRIPTORS
DESCRIPTORS: DULL
DESCRIPTORS: HEADACHE
DESCRIPTORS: ACHING
DESCRIPTORS: ACHING
DESCRIPTORS: DULL

## 2020-11-03 ASSESSMENT — PAIN DESCRIPTION - ONSET
ONSET: GRADUAL
ONSET: ON-GOING
ONSET: ON-GOING

## 2020-11-03 ASSESSMENT — PAIN SCALES - GENERAL
PAINLEVEL_OUTOF10: 3
PAINLEVEL_OUTOF10: 10
PAINLEVEL_OUTOF10: 3
PAINLEVEL_OUTOF10: 4
PAINLEVEL_OUTOF10: 4
PAINLEVEL_OUTOF10: 0
PAINLEVEL_OUTOF10: 4
PAINLEVEL_OUTOF10: 4
PAINLEVEL_OUTOF10: 5
PAINLEVEL_OUTOF10: 4
PAINLEVEL_OUTOF10: 0
PAINLEVEL_OUTOF10: 4
PAINLEVEL_OUTOF10: 4

## 2020-11-03 ASSESSMENT — PAIN - FUNCTIONAL ASSESSMENT
PAIN_FUNCTIONAL_ASSESSMENT: ACTIVITIES ARE NOT PREVENTED

## 2020-11-03 ASSESSMENT — PAIN DESCRIPTION - LOCATION
LOCATION: BACK
LOCATION: CHEST
LOCATION: HEAD
LOCATION: BACK
LOCATION: CHEST

## 2020-11-03 ASSESSMENT — PAIN DESCRIPTION - ORIENTATION
ORIENTATION: LOWER
ORIENTATION: ANTERIOR
ORIENTATION: LOWER

## 2020-11-03 ASSESSMENT — PAIN DESCRIPTION - FREQUENCY
FREQUENCY: CONTINUOUS
FREQUENCY: CONTINUOUS
FREQUENCY: INTERMITTENT

## 2020-11-03 NOTE — CARE COORDINATION
DISCHARGE PLANNING EVALUATION: OP/OBSERVATION        11/3/20, 12:31 PM ROSSI Kenney       Admitted from: ER 11/3/2020/ 0014   Location: [facility]21/021-A Reason for admit: Chest pain [R07.9]   Admit order signed?: no    Pertinent Info/Orders/Treatment Plan:  Trops neg. EKG with TWI in anterior leads. Cardio consulted, planning heart cath today. PCP: Hakan Keenan RN    Patient Goals/Plan/Treatment Preferences: Spoke with Gm Osmanelidia, she plans return home with her  at discharge. She is independent, denies needs. Transportation/Food Security/Housekeeping Addressed:  No issues identified.

## 2020-11-03 NOTE — H&P
History & Physical        Patient:  Haresh Verdugo  YOB: 1960    MRN: [id number]     Acct: [de-identified]    PCP: Camila Cowan RN    Date of Admission: 11/3/2020    Date of Service: Pt seen/examined on 20  and Admitted to Observation with expected LOS less than two midnights due to medical therapy. ASSESSMENT/PLAN:    1. Acute chest pain--initial troponin is normal so will trend to rule out ACS; add aspirin, continue statin, have cardiology see,   2. Essential hypertension, uncontrolled--on beta-blocker, Lasix; monitor  3. CAD status post two-vessel CABG--on statin, Effient, Imdur, Lopressor, aspirin at home  4. Obesity with BMI 32.77    Chief Complaint: Chest pain    History Of Present Illness:    63 y.o. female who presented to [address] [place] [address] with chest pain; patient has a past medical history of a two-vessel bypass in  along with fibromyalgia; she states [facility] and pain in her left arm; she states when she rest it gets better however this is been an ongoing issue; she states over the past week however she has had increased fatigue and has noted some diaphoresis; she states what brought her into the emergency room is some midsternal chest pain, she states she did take a nitroglycerin at home that did ease it and currently she is rating it 1 out of 10; initially she refused admission and then she decided to stay; she denies any shortness of breath, lightheadedness or dizziness, or nausea; in the emergency department her initial troponin was negative, her COVID-19 screen was negative, and her BNP was negative;      Past Medical History:          Diagnosis Date    Arthritis     CAD (coronary artery disease)     Fibromyalgia     Heart disease     Hypertension        Past Surgical History:          Procedure Laterality Date     SECTION  , 1988    x2    CORONARY ANGIOPLASTY WITH STENT PLACEMENT  2015, 2016, 2017    x3    CORONARY ARTERY BYPASS GRAFT  2015    x2     HERNIA REPAIR  1998    OTHER SURGICAL HISTORY      bracheballoon therapy    PARTIAL HYSTERECTOMY  1997    TONSILLECTOMY  1963       Medications Prior to Admission:      Prior to Admission medications    Medication Sig Start Date End Date Taking? Authorizing Provider   atorvastatin (LIPITOR) 80 MG tablet Take 1 tablet by mouth daily 10/15/20   LEIGHA Garcia CNP   hydrOXYzine (ATARAX) 10 MG tablet Take 10 mg by mouth 3 times daily as needed for Itching    Historical Provider, MD   cyclobenzaprine (FLEXERIL) 10 MG tablet Take 10 mg by mouth 3 times daily as needed for Muscle spasms    Historical Provider, MD   DULoxetine (CYMBALTA) 30 MG extended release capsule Take 30 mg by mouth daily Hasn't started yet    Historical Provider, MD   prasugrel (EFFIENT) 10 MG TABS Take 1 tablet by mouth daily 6/25/20   Nydia Bustamante MD   isosorbide mononitrate (IMDUR) 60 MG extended release tablet Take 1 tablet by mouth daily 6/25/20   Nydia Bustamante MD   metoprolol tartrate (LOPRESSOR) 25 MG tablet Take 1 tablet by mouth 2 times daily 6/25/20   Nydia Bustamante MD   Icosapent Ethyl (VASCEPA) 1 g CAPS capsule Take 2 capsules by mouth 2 times daily 6/25/20   Sathish Kam PA-C   nitroGLYCERIN (NITROSTAT) 0.4 MG SL tablet Place 1 tablet under the tongue every 5 minutes as needed for Chest pain up to max of 3 total doses. If no relief after 1 dose, call 911. 3/23/20   Nydia Bustamante MD   Biotin 1000 MCG TABS Take by mouth    Historical Provider, MD   Ascorbic Acid (VITAMIN C) 1000 MG tablet Take 1,000 mg by mouth daily    Historical Provider, MD   HYDROcodone-acetaminophen (NORCO) 5-325 MG per tablet Take 1 tablet by mouth every 6 hours as needed for Pain .     Historical Provider, MD   Multiple Vitamins-Minerals (CENTRUM SILVER 50+WOMEN) TABS Take by mouth daily    Historical Provider, MD   furosemide (LASIX) 20 MG tablet Take 20 mg by mouth 2 times daily     Historical Provider, MD   ALPRAZolam (XANAX) 1 MG tablet Take 1 mg by mouth nightly as needed for Anxiety    Historical Provider, MD   aspirin 81 MG tablet Take 81 mg by mouth daily    Historical Provider, MD   vitamin B-12 (CYANOCOBALAMIN) 1000 MCG tablet Take 1,000 mcg by mouth daily    Historical Provider, MD   DHA-EPA-Coenzyme Q10-Vitamin E (COQ-10 & FISH OIL PO) Take by mouth    Historical Provider, MD       Allergies:  Patient has no known allergies. Social History:   reports that she quit smoking about 5 years ago. Her smoking use included cigarettes. She smoked 0.50 packs per day. She quit smokeless tobacco use about 5 years ago. She reports current alcohol use. She reports that she does not use drugs.     Family History:      Positive as follows:        Problem Relation Age of Onset    Liver Disease Mother         pulmonary hypertension    Arthritis Father     Heart Disease Father     Heart Disease Brother     Early Death Neg Hx        REVIEW OF SYSTEMS:     Constitutional: ROS: positive for  - fatigue, diaphoresis  Head: no headache, no head injury, no migraine   Eyes ROS: denies blurred/double vision  Ears ROS: no hearing difficulty, no tinnitus  Mouth and Throat ROS: no ulceration, dysphagia, dental caries  Psychological ROS: no depression, no anxiety, no panic attacks, denies suicide/homicide ideation  Endocrine ROS: denies polyuria, polydypsia, no heat or cold intolerance  Respiratory ROS: no cough, shortness of breath, or wheezing  Cardiovascular ROS: positive for - chest pain  Gastrointestinal ROS: no abdominal pain, change in bowel habits, or black or bloody stools  Genito-Urinary ROS: denies dysuria, frequency, urgency; denies hematuria  Musculoskeletal ROS: negative  Neurological ROS: no syncope, no seizures, no numbness or tingling of hands, no numbness or tingling of feet, no paresis  Dermatology: no skin rash, no eczema  Endocrine: no polyuria, polydypsia, no heat/cold intolerance  Hematology: denies bruising easily, denies bleeding problems, denies clotting disorders    PHYSICAL EXAM:    BP (!) 102/57   Pulse 62   Temp 98 °F (36.7 °C) (Oral)   Resp 18   Ht 5' 3\" (1.6 m)   Wt 185 lb (83.9 kg)   SpO2 97%   BMI 32.77 kg/m²     General appearance:  No apparent distress, appears stated age and cooperative. HEENT:  Normal cephalic, atraumatic without obvious deformity. Pupils equal, round, and reactive to light. Conjunctivae/corneas clear. Neck: Supple, with full range of motion. No jugular venous distention. Trachea midline. Respiratory:  Normal respiratory effort. Clear to auscultation, bilaterally without Rales/Wheezes/Rhonchi. Cardiovascular:  Regular rate and rhythm with normal S1/S2 without murmurs, rubs or gallops. Abdomen: Soft, non-tender, non-distended with normal bowel sounds. Musculoskeletal:  No clubbing, cyanosis or edema bilaterally. Full range of motion without deformity. Skin: Skin color, texture, turgor normal.    Neurologic:  Neurovascularly intact without any focal sensory/motor deficits. Cranial nerves: II-XII intact, grossly non-focal.  Psychiatric:  Alert and oriented, thought content appropriate  Capillary Refill: Brisk,< 3 seconds   Peripheral Pulses: +2 palpable, equal bilaterally       Labs:     Recent Labs     11/03/20  0030   WBC 7.2   HGB 14.0   HCT 41.9        Recent Labs     11/03/20  0030      K 3.6      CO2 29   BUN 17   CREATININE 1.0   CALCIUM 9.4     No results for input(s): AST, ALT, BILIDIR, BILITOT, ALKPHOS in the last 72 hours. No results for input(s): INR in the last 72 hours. Recent Labs     11/03/20  0030   TROPONINT < 0.010     Radiology:     Xr Chest Portable    Result Date: 11/3/2020  1 view chest x-ray. Comparison: None Findings: Post-CABG. Coronary artery stent. No consolidation, pleural effusion, or pneumothorax. Heart size normal. No CHF. No acute fracture.      Impression: 1. No active process. Negative heart and lungs. This document has been electronically signed by: Lisseth Lagunas MD on 11/03/2020 03:06 AM       EKG:  I have reviewed the EKG with the following interpretation: SR HR 72 with inverted T waves in lead V2, V3      Thank you Duyen Estrella RN for the opportunity to be involved in this patient's care.     Electronically signed by LEIGHA Mg CNP on 11/3/2020 at 3:13 AM

## 2020-11-03 NOTE — ED PROVIDER NOTES
[address] [address]          Pt Name: Mono Gerardo  MRN: [id number]  [place] 1960  Date of evaluation: 11/3/2020  Emergency Physician: Blossom Jackson, [address] [facility]       Chief Complaint   Patient presents with    Shortness of Breath    Chest Pain     History obtained from the patient. HISTORY OF PRESENT ILLNESS    HPI  Mono Gerardo is a 63 y.o. female who presents to the emergency department for evaluation of chest pain. Presents with fatigue for the last 3 days. Started having chest pain with associated shortness of breath chest pain described as pressure aching going up to her left neck and down her left arm. Left arm symptoms she states is tingly. She states her last episode was 2 hours prior to arrival.  It lasted for approximately 15 minutes and is currently resolved. Her 2 prior episodes today she states she took nitroglycerin which helped her pain. No surgery no nausea or vomiting no abdominal pain. Patient with multiple stents and prior CABG. she reports she is compliant with her aspirin and Effient  The patient has no other acute complaints at this time. REVIEW OF SYSTEMS   Review of Systems   Constitutional: Positive for fatigue. Negative for activity change, chills and fever. HENT: Negative for congestion, rhinorrhea, sinus pressure and sore throat. Eyes: Negative for redness and visual disturbance. Respiratory: Positive for shortness of breath. Negative for chest tightness and wheezing. Cardiovascular: Positive for chest pain. Negative for palpitations and leg swelling. Gastrointestinal: Negative for abdominal distention, abdominal pain, constipation, diarrhea, nausea and vomiting. Endocrine: Negative for polydipsia and polyuria. Genitourinary: Negative for decreased urine volume, dysuria and urgency. Musculoskeletal: Negative for back pain and myalgias.    Skin: Negative for pallor and rash. Neurological: Negative for weakness, light-headedness and headaches. Hematological: Negative for adenopathy. Does not bruise/bleed easily. Psychiatric/Behavioral: Negative for self-injury and suicidal ideas. PAST MEDICAL AND SURGICAL HISTORY     Past Medical History:   Diagnosis Date    CAD (coronary artery disease)     Fibromyalgia     Heart disease     Hypertension      Past Surgical History:   Procedure Laterality Date     SECTION  , 1988    x2    CORONARY ANGIOPLASTY WITH STENT PLACEMENT  , , 2017    x3    CORONARY ARTERY BYPASS GRAFT  2015    x2     HERNIA REPAIR  1998    OTHER SURGICAL HISTORY      bracheballoon therapy    PARTIAL HYSTERECTOMY  1997    TONSILLECTOMY  1963         MEDICATIONS     Current Facility-Administered Medications:     aspirin chewable tablet 324 mg, 324 mg, Oral, Once, Hallie Oliverer, DO    Current Outpatient Medications:     atorvastatin (LIPITOR) 80 MG tablet, Take 1 tablet by mouth daily, Disp: 90 tablet, Rfl: 3    hydrOXYzine (ATARAX) 10 MG tablet, Take 10 mg by mouth 3 times daily as needed for Itching, Disp: , Rfl:     cyclobenzaprine (FLEXERIL) 10 MG tablet, Take 10 mg by mouth 3 times daily as needed for Muscle spasms, Disp: , Rfl:     DULoxetine (CYMBALTA) 30 MG extended release capsule, Take 30 mg by mouth daily Hasn't started yet, Disp: , Rfl:     prasugrel (EFFIENT) 10 MG TABS, Take 1 tablet by mouth daily, Disp: 90 tablet, Rfl: 3    isosorbide mononitrate (IMDUR) 60 MG extended release tablet, Take 1 tablet by mouth daily, Disp: 90 tablet, Rfl: 3    metoprolol tartrate (LOPRESSOR) 25 MG tablet, Take 1 tablet by mouth 2 times daily, Disp: 180 tablet, Rfl: 3    Icosapent Ethyl (VASCEPA) 1 g CAPS capsule, Take 2 capsules by mouth 2 times daily, Disp: 360 capsule, Rfl: 0    nitroGLYCERIN (NITROSTAT) 0.4 MG SL tablet, Place 1 tablet under the tongue every 5 minutes as needed for Chest pain up to max of 3 total doses. If no relief after 1 dose, call 911., Disp: 25 tablet, Rfl: 3    Biotin 1000 MCG TABS, Take by mouth, Disp: , Rfl:     Ascorbic Acid (VITAMIN C) 1000 MG tablet, Take 1,000 mg by mouth daily, Disp: , Rfl:     HYDROcodone-acetaminophen (NORCO) 5-325 MG per tablet, Take 1 tablet by mouth every 6 hours as needed for Pain ., Disp: , Rfl:     Multiple Vitamins-Minerals (CENTRUM SILVER 50+WOMEN) TABS, Take by mouth daily, Disp: , Rfl:     furosemide (LASIX) 20 MG tablet, Take 20 mg by mouth 2 times daily , Disp: , Rfl:     ALPRAZolam (XANAX) 1 MG tablet, Take 1 mg by mouth nightly as needed for Anxiety, Disp: , Rfl:     aspirin 81 MG tablet, Take 81 mg by mouth daily, Disp: , Rfl:     vitamin B-12 (CYANOCOBALAMIN) 1000 MCG tablet, Take 1,000 mcg by mouth daily, Disp: , Rfl:     DHA-EPA-Coenzyme Q10-Vitamin E (COQ-10 & FISH OIL PO), Take by mouth, Disp: , Rfl:       SOCIAL HISTORY     Social History     Social History Narrative    Not on file     Social History     Tobacco Use    Smoking status: Former Smoker    Smokeless tobacco: Former User     Quit date: 8/20/2015   Substance Use Topics    Alcohol use: Yes     Comment: occ    Drug use: No         ALLERGIES   No Known Allergies      FAMILY HISTORY     Family History   Problem Relation Age of Onset    Liver Disease Mother         pulmonary hypertension    Arthritis Father     Heart Disease Father     Heart Disease Brother     Early Death Neg Hx          PHYSICAL EXAM     ED Triage Vitals [11/03/20 0020]   BP Temp Temp Source Pulse Resp SpO2 Height Weight   125/77 98 °F (36.7 °C) Oral 74 12 97 % 5' 3\" (1.6 m) 185 lb (83.9 kg)         Additional Vital Signs:  Vitals:    11/03/20 0020   BP: 125/77   Pulse: 74   Resp: 12   Temp: 98 °F (36.7 °C)   SpO2: 97%       Physical Exam  Constitutional:       General: She is not in acute distress. Appearance: She is well-developed. She is not diaphoretic. HENT:      Head: Normocephalic and atraumatic.    Eyes: chest x-ray. ED RESULTS   Laboratory results:  Labs Reviewed   BASIC METABOLIC PANEL W/ REFLEX TO MG FOR LOW K - Abnormal; Notable for the following components:       Result Value    Glucose 129 (*)     All other components within normal limits   GLOMERULAR FILTRATION RATE, ESTIMATED - Abnormal; Notable for the following components:    Est, Glom Filt Rate 56 (*)     All other components within normal limits   BRAIN NATRIURETIC PEPTIDE   CBC WITH AUTO DIFFERENTIAL   TROPONIN   ANION GAP   OSMOLALITY   COVID-19   COVID-19       Radiologic studies results:  XR CHEST PORTABLE    (Results Pending)       ED Medications administered this visit:   Medications   aspirin chewable tablet 324 mg (324 mg Oral Given 11/3/20 0048)         ED COURSE        Patient with T wave inversions anterior leads new from prior ECG. Troponins negative. Chest pain is resolved. Patient initially decided to sign out AMA. He then was agreeable to admission. Discussed with Lizbet Askew hospitalist who graciously accepted admission. MEDICATION CHANGES     DISCHARGE MEDICATIONS:  New Prescriptions    No medications on file            FINAL DISPOSITION     Final diagnoses:   Chest pain, unspecified type     Condition: condition: good  Dispo: Admit to telemetry    PATIENT REFERRED TO:  No follow-up provider specified. This transcription was electronically signed. Parts of this transcriptions may have been dictated by use of voice recognition software and electronically transcribed, and parts may have been transcribed with the assistance of an ED scribe. The transcription may contain errors not detected in proofreading.     Electronically Signed: Wero Jackson, 11/03/20, 12:44 AM      Wero Jackson,   11/03/20 [id number]

## 2020-11-03 NOTE — PLAN OF CARE
Was asked to admit patient for Dr Mancil Severance in ED, I went in to speak with her and she refused admission; I discussed with Dr Mancil Severance and we both went into speak with her and again she is refusing to stay and wants to call her cardiologist in [place]; was advised to return for any change or concerns.

## 2020-11-03 NOTE — CONSULTS
[facility]  Cardiology Consult        Patient:  Yordan Wells  YOB: 1960  MRN: [id number]     Acct: [de-identified]    PCP: Ofelia Roche RN    Date of Admission: 11/3/2020      Reason for Consultation:  Chest pain with hx of CABG      History Of Present Illness:    63 y.o. pleasant female c  Hx of  CABG x 2 , s/p Multiple PCI 2018, HTN, Fibromyalgia who presented to the hospital with complaints of fatigue, chest pain. As per patient the symptoms have been worsening over the last 5-6 days, she is aware of her fibromyalgia so she tries to not come to hospital right away. The chest pain midsternal, radiates to left side, denies sob, diaphoresis. She reports having coronary brachytherapy at [facility] 2019. EKG shows TWI in the anterior leads. Trops x 2 negative, Cr 1.0, H/H 14/      Past Medical History:          Diagnosis Date    Arthritis     CAD (coronary artery disease)     Fibromyalgia     Heart disease     Hypertension        Past Surgical History:          Procedure Laterality Date     SECTION  , 1988    x2    CORONARY ANGIOPLASTY WITH STENT PLACEMENT  , , 2017    x3    CORONARY ARTERY BYPASS GRAFT  2015    x2     HERNIA REPAIR  1998    OTHER SURGICAL HISTORY      bracheballoon therapy    PARTIAL HYSTERECTOMY  [facility]       Medications Prior to Admission:      Prior to Admission medications    Medication Sig Start Date End Date Taking?  Authorizing Provider   Omega-3 350 MG CPDR Take 360 mg by mouth 2 times daily   Yes Historical Provider, MD   calcium-vitamin D (OSCAL) 250-125 MG-UNIT per tablet Take 1 tablet by mouth daily 600mg   Yes Historical Provider, MD   atorvastatin (LIPITOR) 80 MG tablet Take 1 tablet by mouth daily 10/15/20  Yes LEIGHA Garner - CNP   cyclobenzaprine (FLEXERIL) 10 MG tablet Take 10 mg by mouth 3 times daily as needed for Muscle spasms   Yes Historical Provider, MD   prasugrel (EFFIENT) 10 MG TABS Take 1 tablet by mouth daily 6/25/20  Yes Robson Estes MD   isosorbide mononitrate (IMDUR) 60 MG extended release tablet Take 1 tablet by mouth daily 6/25/20  Yes Robson Estes MD   metoprolol tartrate (LOPRESSOR) 25 MG tablet Take 1 tablet by mouth 2 times daily 6/25/20  Yes Robson Estes MD   nitroGLYCERIN (NITROSTAT) 0.4 MG SL tablet Place 1 tablet under the tongue every 5 minutes as needed for Chest pain up to max of 3 total doses. If no relief after 1 dose, call 911. 3/23/20  Yes Robson Estes MD   Biotin 1000 MCG TABS Take by mouth   Yes Historical Provider, MD   Ascorbic Acid (VITAMIN C) 1000 MG tablet Take 1,000 mg by mouth daily   Yes Historical Provider, MD   HYDROcodone-acetaminophen (NORCO) 5-325 MG per tablet Take 1 tablet by mouth every 6 hours as needed for Pain .    Yes Historical Provider, MD   Multiple Vitamins-Minerals (CENTRUM SILVER 50+WOMEN) TABS Take by mouth daily   Yes Historical Provider, MD   furosemide (LASIX) 20 MG tablet Take 20 mg by mouth 2 times daily    Yes Historical Provider, MD   ALPRAZolam (XANAX) 1 MG tablet Take 1 mg by mouth nightly as needed for Anxiety   Yes Historical Provider, MD   aspirin 81 MG tablet Take 81 mg by mouth daily   Yes Historical Provider, MD   vitamin B-12 (CYANOCOBALAMIN) 1000 MCG tablet Take 1,000 mcg by mouth daily   Yes Historical Provider, MD   DHA-EPA-Coenzyme Q10-Vitamin E (COQ-10 & FISH OIL PO) Take by mouth   Yes Historical Provider, MD       Current Facility-Administered Medications   Medication Dose Route Frequency Provider Last Rate Last Dose    ALPRAZolam (XANAX) tablet 1 mg  1 mg Oral Nightly PRN LEIGHA Resendiz - CNP        vitamin C (ASCORBIC ACID) tablet 1,000 mg  1,000 mg Oral Daily LEIGHA Resendiz CNP   1,000 mg at 11/03/20 0859    atorvastatin (LIPITOR) tablet 80 mg  80 mg Oral Daily LEIGHA Wallace CNP   80 mg at 11/03/20 0900    cyclobenzaprine acid (EFFER-K) effervescent tablet 40 mEq  40 mEq Oral PRN LEIGHA Montoya CNP        Or    potassium chloride 10 mEq/100 mL IVPB (Peripheral Line)  10 mEq Intravenous PRN LEIGHA Wallace - CNP        magnesium sulfate 2 g in 50 mL IVPB premix  2 g Intravenous PRN Teresa Walls APRN - CNP        bisacodyl (DULCOLAX) EC tablet 5 mg  5 mg Oral Daily PRN Teresa Walls APRN - CNP        enoxaparin (LOVENOX) injection 40 mg  40 mg Subcutaneous Daily Teresa A Kavita, LEIGHA - CNP        nitroGLYCERIN (NITROSTAT) SL tablet 0.4 mg  0.4 mg Sublingual Q5 Min PRN LEIGHA Montoya CNP           Allergies:  Patient has no known allergies. Social History:    TOBACCO:   reports that she quit smoking about 5 years ago. Her smoking use included cigarettes. She smoked 0.50 packs per day. She quit smokeless tobacco use about 5 years ago. ETOH:   reports current alcohol use. Family History:        Problem Relation Age of Onset    Liver Disease Mother         pulmonary hypertension    Arthritis Father     Heart Disease Father     Heart Disease Brother     Early Death Neg Hx          Review of Systems -   General ROS: negative  Psychological ROS: negative  Hematological and Lymphatic ROS: No history of blood clots or bleeding disorder. Respiratory ROS: no cough, shortness of breath, or wheezing  Cardiovascular ROS: As per HPI  Gastrointestinal ROS: negative  Genito-Urinary ROS: no dysuria, trouble voiding, or hematuria  Musculoskeletal ROS: negative  Neurological ROS: no TIA or stroke symptoms  Dermatological ROS: negative    All others reviewed and are negative.        /63   Pulse 60   Temp 97.8 °F (36.6 °C) (Oral)   Resp 18   Ht 5' 3\" (1.6 m)   Wt 187 lb 12.8 oz (85.2 kg)   SpO2 96%   BMI 33.27 kg/m²       Physical Examination:   General appearance - alert, in no distress  Mental status - alert, oriented to person, place, and time  Neck - supple, no significant adenopathy, no JVD, or carotid bruits  Chest - clear to auscultation, no wheezes, rales or rhonchi, symmetric air entry  Heart - normal rate, regular rhythm, normal S1, S2, no murmurs, rubs, clicks or gallops  Abdomen - soft, nontender, nondistended, no masses or organomegaly  Neurological - alert, oriented, normal speech, no focal findings or movement disorder noted  Musculoskeletal - no joint tenderness, deformity or swelling  Extremities - peripheral pulses normal, no pedal edema, no clubbing or cyanosis  Skin - normal coloration and turgor, no rashes, no suspicious skin lesions noted      LABS:    Recent Labs     11/03/20  0030 11/03/20  0541   TROPONINT < 0.010 < 0.010     CBC:   Lab Results   Component Value Date    WBC 7.2 11/03/2020    RBC 4.30 11/03/2020    HGB 14.0 11/03/2020    HCT 41.9 11/03/2020    MCV 97.4 11/03/2020    MCH 32.6 11/03/2020    MCHC 33.4 11/03/2020    RDW 12.9 12/01/2017     11/03/2020    MPV 9.6 11/03/2020     BMP:    Lab Results   Component Value Date     11/03/2020    K 3.6 11/03/2020     11/03/2020    CO2 29 11/03/2020    BUN 17 11/03/2020    LABALBU 4.5 03/24/2020    CREATININE 1.0 11/03/2020    CALCIUM 9.4 11/03/2020    LABGLOM 56 11/03/2020    GLUCOSE 129 11/03/2020    GLUCOSE 119 12/01/2017     Hepatic Function Panel:    Lab Results   Component Value Date    ALKPHOS 82 03/24/2020    ALT 32 03/24/2020    AST 21 03/24/2020    PROT 7.2 03/24/2020    BILITOT 0.5 03/24/2020    BILIDIR <0.2 03/24/2020    LABALBU 4.5 03/24/2020     Magnesium:    Lab Results   Component Value Date    MG 2.2 06/11/2019     Warfarin PT/INR:  No components found for: PTPATWAR, PTINRWAR  HgBA1c:  No results found for: LABA1C  FLP:    Lab Results   Component Value Date    TRIG 154 11/03/2020    HDL 56 11/03/2020    LDLCALC 31 11/03/2020     TSH:  No results found for: TSH  BNP: No components found for: PRO-BNP      Assessment/Plan:    Patient Active Problem List   Diagnosis    Chest pain Chest pain concerning for unstable angina  CAD s/p CABG 2015  PCI in 2018, 2019   Underwent cornary brachytherapy for ISR at [facility]  Fibromyalgia  HTN  Former smoker    Aspirin, effient  Statin, imdur, metoprolol  Needs Echo  Stress test in 11/2019 showed no ischemia  Needs [facility], risks/benefits and alternatives were discussed  Patient would like to proceed. Further recs based on results and clinical course    Please do note hesitate to contact me for any further questions. Thank you for the opportunity to be involved in this patient's care.     Code Status: Full Code    Electronically signed by Mayte Wolf MD on 11/3/2020 at 10:17 AM

## 2020-11-03 NOTE — ED NOTES
ED to inpatient nurses report    Chief Complaint   Patient presents with    Shortness of Breath    Chest Pain      Present to ED from home  LOC: alert and orientated to name, place, date  Vital signs   Vitals:    11/03/20 0020 11/03/20 0049 11/03/20 0151   BP: 125/77 125/66 (!) 102/57   Pulse: 74 64 62   Resp: 12 18 18   Temp: 98 °F (36.7 °C)     TempSrc: Oral     SpO2: 97% 98% 97%   Weight: 185 lb (83.9 kg)     Height: 5' 3\" (1.6 m)        Oxygen Baseline 98% on room air    Current needs required N/A  LDAs:   Peripheral IV 11/03/20 Left Antecubital (Active)   Site Assessment Clean;Dry; Intact 11/03/20 0151   Line Status Normal saline locked 11/03/20 0151   Dressing Status Clean;Dry; Intact 11/03/20 0151     Mobility: Requires assistance * 1  Pending ED orders: N/A  Present condition: VSS    Electronically signed by Isabelle Bartholomew RN on 11/3/2020 at [place] [id number], RN  11/03/20 0638

## 2020-11-03 NOTE — ED TRIAGE NOTES
Pt presents to the ED from home c/o chest pain that started tonight. Pt states she took two nitro with a little bit of relief. Pt states the pain starts in her left arm and radiates through her chest. Pt has a cardiac hx as she has had a bypass, and stents placed. Pt states she does not think it is heart as she thinks it has something to do with her neck muscle, from a previous strain back in May. Pt respirations easy and unlabored. Pt rates pain a 4/10 and as a dull feeling. EKG complete.

## 2020-11-03 NOTE — PRE SEDATION
[address] [address]  Sedation/Analgesia History & Physical    Pt Name: Janee Abarca  Account number: [de-identified]  MRN: [id number]  YOB: 1960  Provider Performing Procedure: Som Graham MD  Referring Provider: Lenin Sanchez, *   Primary Care Physician: Melissa Guerrero RN  Date: 11/3/2020    PRE-PROCEDURE    Code Status: FULL CODE  Brief History/Pre-Procedure Diagnosis:  [place]  CABG  Hx of LAD brachytherapy    Consent: : I have discussed with the patient risks, benefits, and alternatives (and relevant risks, benefits, and side effects related to alternatives or not receiving care), and likelihood of the success. The patient and/or representative appear to understand and agree to proceed. The discussion encompasses risks, benefits, and side effects related to the alternatives and the risks related to not receiving the proposed care, treatment, and services. The indication, risks and benefits of the procedure and possible therapeutic consequences and alternatives were discussed with the patient. The patient was given the opportunity to ask questions and to have them answered to his/her satisfaction. Risks of the procedure include but are not limited to the following: Bleeding, hematoma including retroperitoneal hemmorhage, infection, pain and discomfort, injury to the aorta and other blood vessels, rhythm disturbance, low blood pressure, myocardial infarction, stroke, kidney damage/failure, myocardial perforation, allergic reactions to sedatives/contrast material, loss of pulse/vascular compromise requiring surgery, aneurysm/pseudoaneurysm formation, possible loss of a limb/hand/leg, needing blood transfusion, requiring emergent open heart surgery or emergent coronary intervention, the need for intubation/respiratory support, the requirement for defibrillation/cardioversion, and death. Alternatives to and omission of the suggested procedure were discussed.  The patient had no further questions and wished to proceed; the consent form was signed. MEDICAL HISTORY  [x]ASHD/ANGINA/MI/CHF   [x]Hypertension  []Diabetes  [x]Hyperlipidemia  []Smoking  []Family Hx of ASHD  [x]Additional information:       has a past medical history of Arthritis, CAD (coronary artery disease), Fibromyalgia, Heart disease, and Hypertension. SURGICAL HISTORY   has a past surgical history that includes Coronary artery bypass graft ();  section ([address] [place] [address]); Tonsillectomy (); hernia repair (); partial hysterectomy (cervix not removed) (); Coronary angioplasty with stent (, , ); and other surgical history.   Additional information:       ALLERGIES   Allergies as of 2020    (No Known Allergies)     Additional information:       MEDICATIONS   Aspirin  [x] 81 mg  [] 325 mg  [] None  Antiplatelet drug therapy use last 5 days  []No [x]Yes  Coumadin Use Last 5 Days [x]No []Yes  Other anticoagulant use last 5 days  [x]No []Yes    Current Facility-Administered Medications:     ALPRAZolam (XANAX) tablet 1 mg, 1 mg, Oral, Nightly PRN, Teresa Walls, APRN - CNP    vitamin C (ASCORBIC ACID) tablet 1,000 mg, 1,000 mg, Oral, Daily, Teresa Walls, APRN - CNP, 1,000 mg at 20 0859    atorvastatin (LIPITOR) tablet 80 mg, 80 mg, Oral, Daily, Teresa Doddan, APRN - CNP, 80 mg at 20 0900    cyclobenzaprine (FLEXERIL) tablet 10 mg, 10 mg, Oral, TID PRN, Teresa Walls, APRN - CNP    furosemide (LASIX) tablet 20 mg, 20 mg, Oral, BID, Teresa Walls, APRN - CNP, 20 mg at 20 0859    hydrOXYzine (ATARAX) tablet 10 mg, 10 mg, Oral, TID PRN, Kay Elaine APRN - CNP    Icosapent Ethyl (VASCEPA) capsule CAPS 2 capsule, 2 capsule, Oral, BID, Teresa Walls APRN - CNP    isosorbide mononitrate (IMDUR) extended release tablet 60 mg, 60 mg, Oral, Daily, Teresa Walls APRN - CNP, 60 mg at 20 0859    metoprolol tartrate (LOPRESSOR) tablet 25 mg, 25 mg, Oral, BID, LEIGHA Wallace CNP, 25 mg at 11/03/20 0859    HYDROcodone-acetaminophen (NORCO) 5-325 MG per tablet 1 tablet, 1 tablet, Oral, Q6H PRN, LEIGHA Mg CNP, 1 tablet at 11/03/20 8866    prasugrel (EFFIENT) tablet 10 mg, 10 mg, Oral, Daily, LEIGHA Wallace CNP, 10 mg at 11/03/20 0900    vitamin B-12 (CYANOCOBALAMIN) tablet 1,000 mcg, 1,000 mcg, Oral, Daily, LEIGHA Wallace CNP, 1,000 mcg at 11/03/20 0859    sodium chloride flush 0.9 % injection 10 mL, 10 mL, Intravenous, 2 times per day, LEIGHA Mg CNP, 10 mL at 11/03/20 0905    sodium chloride flush 0.9 % injection 10 mL, 10 mL, Intravenous, PRN, LEIGHA Mg CNP    acetaminophen (TYLENOL) tablet 650 mg, 650 mg, Oral, Q6H PRN, 650 mg at 11/03/20 1350 **OR** acetaminophen (TYLENOL) suppository 650 mg, 650 mg, Rectal, Q6H PRN, LEIGHA Wallace CNP    promethazine (PHENERGAN) tablet 12.5 mg, 12.5 mg, Oral, Q6H PRN **OR** ondansetron (ZOFRAN) injection 4 mg, 4 mg, Intravenous, Q6H PRN, LEIGHA Mg CNP    [START ON 11/4/2020] aspirin chewable tablet 81 mg, 81 mg, Oral, Daily, LEIGHA Wallace CNP    potassium chloride (KLOR-CON M) extended release tablet 40 mEq, 40 mEq, Oral, PRN **OR** potassium bicarb-citric acid (EFFER-K) effervescent tablet 40 mEq, 40 mEq, Oral, PRN **OR** potassium chloride 10 mEq/100 mL IVPB (Peripheral Line), 10 mEq, Intravenous, PRN, LEIGHA Wallace CNP    magnesium sulfate 2 g in 50 mL IVPB premix, 2 g, Intravenous, PRN, LEIGHA Wallace CNP    bisacodyl (DULCOLAX) EC tablet 5 mg, 5 mg, Oral, Daily PRN, LEIGHA Wallace CNP    enoxaparin (LOVENOX) injection 40 mg, 40 mg, Subcutaneous, Daily, LEIGHA Wallace CNP    nitroGLYCERIN (NITROSTAT) SL tablet 0.4 mg, 0.4 mg, Sublingual, Q5 Min PRN, LEIGHA Mg CNP  Prior to Admission medications    Medication Sig Start Date End Date Taking? Authorizing Provider   Cholecalciferol (VITAMIN D3) 50 MCG (2000 UT) CAPS Take 2,000 Units by mouth 2 times daily   Yes Historical Provider, MD   CALCIUM PO Take 1 tablet by mouth 2 times daily   Yes Historical Provider, MD   atorvastatin (LIPITOR) 80 MG tablet Take 1 tablet by mouth daily 10/15/20  Yes LEIGHA Bentley - CNP   cyclobenzaprine (FLEXERIL) 10 MG tablet Take 10 mg by mouth 3 times daily as needed for Muscle spasms   Yes Historical Provider, MD   prasugrel (EFFIENT) 10 MG TABS Take 1 tablet by mouth daily 6/25/20  Yes Santi Arellano MD   isosorbide mononitrate (IMDUR) 60 MG extended release tablet Take 1 tablet by mouth daily 6/25/20  Yes Santi Arellano MD   metoprolol tartrate (LOPRESSOR) 25 MG tablet Take 1 tablet by mouth 2 times daily 6/25/20  Yes Santi Arellano MD   nitroGLYCERIN (NITROSTAT) 0.4 MG SL tablet Place 1 tablet under the tongue every 5 minutes as needed for Chest pain up to max of 3 total doses. If no relief after 1 dose, call 911. 3/23/20  Yes Santi Arellano MD   Biotin 1000 MCG TABS Take 1,000 mcg by mouth daily    Yes Historical Provider, MD   Ascorbic Acid (VITAMIN C) 1000 MG tablet Take 1,000 mg by mouth daily   Yes Historical Provider, MD   HYDROcodone-acetaminophen (NORCO) 5-325 MG per tablet Take 1 tablet by mouth every 6 hours as needed for Pain .    Yes Historical Provider, MD   Multiple Vitamins-Minerals (CENTRUM SILVER 50+WOMEN) TABS Take by mouth daily   Yes Historical Provider, MD   furosemide (LASIX) 20 MG tablet Take 20 mg by mouth 2 times daily    Yes Historical Provider, MD   ALPRAZolam (XANAX) 1 MG tablet Take 1 mg by mouth nightly as needed for Anxiety   Yes Historical Provider, MD   aspirin 81 MG tablet Take 81 mg by mouth 2 times daily    Yes Historical Provider, MD   vitamin B-12 (CYANOCOBALAMIN) 1000 MCG tablet Take 1,000 mcg by mouth daily   Yes Historical Provider, MD   DHA-EPA-Coenzyme Q10-Vitamin E (COQ-10 & FISH OIL PO) Take 1 capsule by mouth daily    Yes Historical Provider, MD     Additional information:       VITAL SIGNS   Vitals:    11/03/20 1214   BP: (!) 118/58   Pulse: 64   Resp: 18   Temp: 97.6 °F (36.4 °C)   SpO2: 93%       PHYSICAL:   General: No acute distress  HEENT:  Unremarkable for age  Neck: without increased JVD, carotid pulses 2+ bilaterally without bruits  Heart: RRR, S1 & S2 WNL, S4 gallop, without murmurs or rubs   NYHA: 2   Lungs: Clear to auscultation    Abdomen: BS present, without HSM, masses, or tendernes    Extremities: without C,C,E.  Pulses 2+ bilaterally  Mental Status: Alert & Oriented        PLANNED PROCEDURE   [x]Cath  [x]PCI                []Pacemaker/AICD  []JIM             []Cardioversion []Peripheral angiography/PTA  []Other:      SEDATION  Planned agent:[x]Midazolam []Meperidine [x]Sublimaze []Morphine  []Diazepam  []Other:     ASA Classification:  []1 []2 [x]3 []4 []5  Class 1: A normal healthy patient  Class 2: Pt with mild to moderate systemic disease  Class 3: Severe systemic disease or disturbance  Class 4: Severe systemic disorders that are already life threatening. Class 5: Moribund pt with little chances of survival, for more than 24 hours. Mallampati I Airway Classification:   []1 []2 [x]3 []4    [x]Pre-procedure diagnostic studies complete and results available. Comment:    [x]Previous sedation/anesthesia experiences assessed. Comment:    [x]The patient is an appropriate candidate to undergo the planned procedure sedation and anesthesia. (Refer to nursing sedation/analgesia documentation record)  [x]Formulation and discussion of sedation/procedure plan, risks, and expectations with patient and/or responsible adult completed. [x]Patient examined immediately prior to the procedure.  (Refer to nursing sedation/analgesia documentation record)    Mariam Berg MD   Electronically signed 11/3/2020 at 4:05 PM

## 2020-11-03 NOTE — BRIEF OP NOTE
[address] [address]  Sedation/Analgesia Post Sedation Record    Pt Name: Beau Osborn  Account number: [de-identified]  MRN: [id number]  YOB: 1960  Procedure Performed By: Kirby Velasquez, [address]  Primary Care Physician: Henny Aj RN  Date: 11/3/2020    POST-PROCEDURE    Physicians/Assistants: HALLEY Barfield MD    Procedure Performed:Cath      Sedation/Anesthesia: Versed/ Fentanyl and 2% xylocaine local anesthesia. Estimated Blood Loss: < 50 ml. Specimens Removed: None         Disposition of Specimen: N/A        Complications: No Immediate Complications.        Post-procedure Diagnosis/Findings:       Severe LAD 99% ISR  SVG to DX supplying LAD which has the ISR distal to the anastomosis  Patent LCX/RCA    Patient request transfer to [facility] in [facility] for [facility] surgery         Kirby Velasquez, [address]  Electronically signed 11/3/2020 at 5:04 PM  Interventional Cardiology

## 2020-11-04 VITALS
SYSTOLIC BLOOD PRESSURE: 120 MMHG | BODY MASS INDEX: 33.06 KG/M2 | OXYGEN SATURATION: 96 % | HEIGHT: 63 IN | TEMPERATURE: 97.8 F | WEIGHT: 186.6 LBS | RESPIRATION RATE: 16 BRPM | HEART RATE: 69 BPM | DIASTOLIC BLOOD PRESSURE: 71 MMHG

## 2020-11-04 LAB
ANION GAP SERPL CALCULATED.3IONS-SCNC: 10 MEQ/L (ref 8–16)
BUN BLDV-MCNC: 12 MG/DL (ref 7–22)
CALCIUM SERPL-MCNC: 9.6 MG/DL (ref 8.5–10.5)
CHLORIDE BLD-SCNC: 105 MEQ/L (ref 98–111)
CO2: 28 MEQ/L (ref 23–33)
CREAT SERPL-MCNC: 0.8 MG/DL (ref 0.4–1.2)
EKG ATRIAL RATE: 61 BPM
EKG P AXIS: 20 DEGREES
EKG P-R INTERVAL: 160 MS
EKG Q-T INTERVAL: 424 MS
EKG QRS DURATION: 78 MS
EKG QTC CALCULATION (BAZETT): 426 MS
EKG R AXIS: 2 DEGREES
EKG T AXIS: 39 DEGREES
EKG VENTRICULAR RATE: 61 BPM
ERYTHROCYTE [DISTWIDTH] IN BLOOD BY AUTOMATED COUNT: 12.5 % (ref 11.5–14.5)
ERYTHROCYTE [DISTWIDTH] IN BLOOD BY AUTOMATED COUNT: 45.2 FL (ref 35–45)
GFR SERPL CREATININE-BSD FRML MDRD: 73 ML/MIN/1.73M2
GLUCOSE BLD-MCNC: 104 MG/DL (ref 70–108)
HCT VFR BLD CALC: 40.6 % (ref 37–47)
HEMOGLOBIN: 13.1 GM/DL (ref 12–16)
MCH RBC QN AUTO: 31.9 PG (ref 26–33)
MCHC RBC AUTO-ENTMCNC: 32.3 GM/DL (ref 32.2–35.5)
MCV RBC AUTO: 98.8 FL (ref 81–99)
PLATELET # BLD: 271 THOU/MM3 (ref 130–400)
PMV BLD AUTO: 10 FL (ref 9.4–12.4)
POTASSIUM REFLEX MAGNESIUM: 4.1 MEQ/L (ref 3.5–5.2)
RBC # BLD: 4.11 MILL/MM3 (ref 4.2–5.4)
SODIUM BLD-SCNC: 143 MEQ/L (ref 135–145)
WBC # BLD: 6.9 THOU/MM3 (ref 4.8–10.8)

## 2020-11-04 PROCEDURE — 85027 COMPLETE CBC AUTOMATED: CPT

## 2020-11-04 PROCEDURE — 99232 SBSQ HOSP IP/OBS MODERATE 35: CPT | Performed by: PHYSICIAN ASSISTANT

## 2020-11-04 PROCEDURE — 80048 BASIC METABOLIC PNL TOTAL CA: CPT

## 2020-11-04 PROCEDURE — 93010 ELECTROCARDIOGRAM REPORT: CPT | Performed by: INTERNAL MEDICINE

## 2020-11-04 PROCEDURE — 36415 COLL VENOUS BLD VENIPUNCTURE: CPT

## 2020-11-04 PROCEDURE — G0378 HOSPITAL OBSERVATION PER HR: HCPCS

## 2020-11-04 PROCEDURE — 2580000003 HC RX 258: Performed by: NURSE PRACTITIONER

## 2020-11-04 PROCEDURE — 1200000003 HC TELEMETRY R&B

## 2020-11-04 PROCEDURE — 93005 ELECTROCARDIOGRAM TRACING: CPT | Performed by: NURSE PRACTITIONER

## 2020-11-04 PROCEDURE — 6370000000 HC RX 637 (ALT 250 FOR IP): Performed by: NURSE PRACTITIONER

## 2020-11-04 PROCEDURE — 99239 HOSP IP/OBS DSCHRG MGMT >30: CPT | Performed by: FAMILY MEDICINE

## 2020-11-04 RX ADMIN — FUROSEMIDE 20 MG: 20 TABLET ORAL at 09:40

## 2020-11-04 RX ADMIN — OXYCODONE HYDROCHLORIDE AND ACETAMINOPHEN 1000 MG: 500 TABLET ORAL at 09:40

## 2020-11-04 RX ADMIN — ALPRAZOLAM 1 MG: 0.5 TABLET ORAL at 22:57

## 2020-11-04 RX ADMIN — ASPIRIN 81 MG: 81 TABLET, CHEWABLE ORAL at 12:26

## 2020-11-04 RX ADMIN — METOPROLOL TARTRATE 25 MG: 25 TABLET, FILM COATED ORAL at 20:07

## 2020-11-04 RX ADMIN — ISOSORBIDE MONONITRATE 60 MG: 60 TABLET ORAL at 09:40

## 2020-11-04 RX ADMIN — Medication 10 ML: at 09:41

## 2020-11-04 RX ADMIN — METOPROLOL TARTRATE 25 MG: 25 TABLET, FILM COATED ORAL at 09:41

## 2020-11-04 RX ADMIN — Medication 10 ML: at 20:08

## 2020-11-04 RX ADMIN — ATORVASTATIN CALCIUM 80 MG: 80 TABLET, FILM COATED ORAL at 09:41

## 2020-11-04 RX ADMIN — ACETAMINOPHEN 650 MG: 325 TABLET ORAL at 14:14

## 2020-11-04 RX ADMIN — Medication 1000 MCG: at 09:40

## 2020-11-04 RX ADMIN — HYDROCODONE BITARTRATE AND ACETAMINOPHEN 1 TABLET: 5; 325 TABLET ORAL at 20:07

## 2020-11-04 RX ADMIN — HYDROCODONE BITARTRATE AND ACETAMINOPHEN 1 TABLET: 5; 325 TABLET ORAL at 09:40

## 2020-11-04 ASSESSMENT — PAIN SCALES - GENERAL
PAINLEVEL_OUTOF10: 3
PAINLEVEL_OUTOF10: 4
PAINLEVEL_OUTOF10: 0
PAINLEVEL_OUTOF10: 0
PAINLEVEL_OUTOF10: 5
PAINLEVEL_OUTOF10: 5

## 2020-11-04 ASSESSMENT — PAIN DESCRIPTION - LOCATION: LOCATION: BACK

## 2020-11-04 ASSESSMENT — PAIN DESCRIPTION - PROGRESSION
CLINICAL_PROGRESSION: NOT CHANGED

## 2020-11-04 ASSESSMENT — PAIN DESCRIPTION - PAIN TYPE: TYPE: CHRONIC PAIN

## 2020-11-04 ASSESSMENT — PAIN DESCRIPTION - ORIENTATION: ORIENTATION: LOWER

## 2020-11-04 ASSESSMENT — PAIN DESCRIPTION - FREQUENCY: FREQUENCY: CONTINUOUS

## 2020-11-04 ASSESSMENT — PAIN DESCRIPTION - ONSET: ONSET: ON-GOING

## 2020-11-04 ASSESSMENT — PAIN DESCRIPTION - DESCRIPTORS: DESCRIPTORS: ACHING

## 2020-11-04 NOTE — CARE COORDINATION
11/4/20, 2:56 PM EST  DISCHARGE ON GOING EVALUATION    [facility] day: 0  Location: 6K-21/021-A Reason for admit: Chest pain [R07.9]  Chest pain [R07.9]   Treatment plan of care: S/P cardiac cath 11/3 - official report pending, progress notes state severe LAD stenosis. Plan is to continue meds but hold effient as she possibly needs redo CABG. Pt requests to go to [facility] in [place] for this. Barriers to Discharge: none  PCP: Yvonne Sanz RN   %  Patient Goals/Plan/Treatment Preferences: Plan is for patient to return home with . Pt requesting to go to [facility] for CT surgery. Dr. Jaeger Knows requests she go by ambulance.

## 2020-11-04 NOTE — DISCHARGE SUMMARY
Hospitalist Discharge Summary        Patient: Racquel Nguyen  YOB: 1960  MRN: [id number]   Acct: [de-identified]    Primary Care Physician: Chanel Dicskon RN    Admit date  11/3/2020    Discharge date:  11/4/2020 4:58 PM  Discharge Diagnoses: Active Hospital Problems    Diagnosis Date Noted    Chest pain [R07.9] 11/03/2020       Code Status:  Full Code     Chief Complaint on presentation :-  Atypical chest pain      Initial admission HPI as below:-  63 y.o. female who presented to [facility] with chest pain; patient has a past medical history of a two-vessel bypass in 2015 along with fibromyalgia; she states since April or May of this year while [facility] she developed chest pain and pain in her left arm; she states when she rest it gets better however this is been an ongoing issue; she states over the past week however she has had increased fatigue and has noted some diaphoresis; she states what brought her into the emergency room is some midsternal chest pain, she states she did take a nitroglycerin at home that did ease it and currently she is rating it 1 out of 10; initially she refused admission and then she decided to stay; she denies any shortness of breath, lightheadedness or dizziness, or nausea; in the emergency department her initial troponin was negative, her COVID-19 screen was negative, and her BNP was negative;     Hospital problem list with assessment and plan updates:-  1. Chest pain likely 2/2 ACS 2/2 Severe LAD occlusion 99% : Patient underwent PCI 11/3/2020 by Dr. Ezequiel Olivarez, patient wanted to go to [facility] for cardiothoracic surgery. Patient presented with acute chest pain--initial troponin is normal and follow-up troponin normal; add aspirin, continue statin high dose, effient hold for CABG in [place]. Patient had CABG in 2015 and PCI in 2018 and 2019.  2. Essential hypertension, --on beta-blocker, Lasix; monitor  3.  CAD status post two-vessel CABG--on statin, effient will hold, Imdur, Lopressor, aspirin at home  4. Obesity with BMI 32.77    Additional discharge final recommendations as below:-  I contacted the transfer center as per request, patient requested to go to [place] to have interventional cardiologist Dr. Vlad White at [facility]. Then I had a contact from them who mentioned they will accept the patient, patient accepted to go there to continue management by the cardiologist that she knows, we will hold Effient for CABG. Patient was hemodynamically stable on discharge. All appointments obtained for the patient at the day of discharge with other specialities including PCP in one week. All questions and concerns addressed. Patient verbalized understandings and was agreeable with discharge planning. Physical Exam:-  Vitals:   Patient Vitals for the past 24 hrs:   BP Temp Temp src Pulse Resp SpO2 Weight   11/04/20 1620 125/67 97.6 °F (36.4 °C) Oral 64 16 97 % --   11/04/20 1223 100/65 97.5 °F (36.4 °C) Oral 70 16 94 % --   11/04/20 0753 116/71 97.9 °F (36.6 °C) Oral 58 18 96 % --   11/04/20 0300 106/66 97.7 °F (36.5 °C) Oral 70 16 96 % 186 lb 9.6 oz (84.6 kg)   11/03/20 2310 119/62 97.5 °F (36.4 °C) Oral 67 16 95 % --   11/03/20 2100 (!) 109/58 97.3 °F (36.3 °C) Oral 67 18 96 % --   11/03/20 2000 136/63 97.8 °F (36.6 °C) Oral 66 18 97 % --   11/03/20 1900 (!) 125/58 -- -- 62 16 96 % --   11/03/20 1834 106/67 -- -- 64 16 97 % --   11/03/20 1800 120/68 -- -- 58 16 97 % --   11/03/20 1745 107/60 98 °F (36.7 °C) Oral 66 16 94 % --   11/03/20 1730 (!) 106/56 -- -- 58 16 94 % --   11/03/20 1715 114/62 -- -- 62 16 95 % --   11/03/20 1700 126/62 97.7 °F (36.5 °C) Oral 66 18 96 % --     Weight:   Weight: 186 lb 9.6 oz (84.6 kg)   24 hour intake/output:     Intake/Output Summary (Last 24 hours) at 11/4/2020 1658  Last data filed at 11/4/2020 1441  Gross per 24 hour   Intake 819.8 ml   Output 0 ml   Net 819.8 ml       1.  General appearance: No apparent distress, appears stated age and cooperative. 2. HEENT: Normal cephalic, atraumatic without obvious deformity. Pupils equal, round, and reactive to light. Extra ocular muscles intact. Conjunctivae/corneas clear. 3. Neck: Supple, with full range of motion. No jugular venous distention. Trachea midline. 4. Respiratory:  Normal respiratory effort. Clear to auscultation, bilaterally without Rales/Wheezes/Rhonchi. 5. Cardiovascular: Regular rate and rhythm with normal S1/S2 without murmurs, rubs or gallops. CABG scar appreciated. 6. Abdomen: Soft, non-tender, non-distended with normal bowel sounds. 7. Musculoskeletal:  No clubbing, cyanosis or edema bilaterally. 8. Skin: Skin color, texture, turgor normal.  No rashes or lesions. 9. Neurologic:  Neurovascularly intact without any focal sensory/motor deficits. Cranial nerves: II-XII intact, grossly non-focal.  10. Psychiatric: Alert and oriented, thought content appropriate, normal insight  11. Capillary Refill: Brisk,< 3 seconds   12. Peripheral Pulses: +2 palpable, equal bilaterally       Discharge Medications:-      Medication List      CONTINUE taking these medications    aspirin 81 MG tablet     atorvastatin 80 MG tablet  Commonly known as:  LIPITOR  Take 1 tablet by mouth daily     Biotin 1000 MCG Tabs     Centrum Silver 50+Women Tabs     COQ-10 & FISH OIL PO     cyclobenzaprine 10 MG tablet  Commonly known as:  FLEXERIL     furosemide 20 MG tablet  Commonly known as:  LASIX     HYDROcodone-acetaminophen 5-325 MG per tablet  Commonly known as:  NORCO     isosorbide mononitrate 60 MG extended release tablet  Commonly known as:  IMDUR  Take 1 tablet by mouth daily     metoprolol tartrate 25 MG tablet  Commonly known as:  LOPRESSOR  Take 1 tablet by mouth 2 times daily     nitroGLYCERIN 0.4 MG SL tablet  Commonly known as:  NITROSTAT  Place 1 tablet under the tongue every 5 minutes as needed for Chest pain up to max of 3 total doses.  If no relief after 1 dose, call 911.      vitamin B-12 1000 MCG tablet  Commonly known as:  CYANOCOBALAMIN     vitamin C 1000 MG tablet     Vitamin D3 50 MCG (2000 UT) Caps     Xanax 1 MG tablet  Generic drug:  ALPRAZolam        STOP taking these medications    CALCIUM PO     prasugrel 10 MG Tabs  Commonly known as:  EFFIENT             Labs :-  Recent Results (from the past 72 hour(s))   EKG 12 Lead    Collection Time: 11/03/20 12:21 AM   Result Value Ref Range    Ventricular Rate 72 BPM    Atrial Rate 72 BPM    P-R Interval 154 ms    QRS Duration 76 ms    Q-T Interval 402 ms    QTc Calculation (Bazett) 440 ms    P Axis 50 degrees    R Axis 10 degrees    T Axis 67 degrees   Basic Metabolic Panel w/ Reflex to MG    Collection Time: 11/03/20 12:30 AM   Result Value Ref Range    Sodium 142 135 - 145 meq/L    Potassium reflex Magnesium 3.6 3.5 - 5.2 meq/L    Chloride 101 98 - 111 meq/L    CO2 29 23 - 33 meq/L    Glucose 129 (H) 70 - 108 mg/dL    BUN 17 7 - 22 mg/dL    CREATININE 1.0 0.4 - 1.2 mg/dL    Calcium 9.4 8.5 - 10.5 mg/dL   Brain Natriuretic Peptide    Collection Time: 11/03/20 12:30 AM   Result Value Ref Range    Pro-BNP 97.3 0.0 - 900.0 pg/mL   CBC Auto Differential    Collection Time: 11/03/20 12:30 AM   Result Value Ref Range    WBC 7.2 4.8 - 10.8 thou/mm3    RBC 4.30 4.20 - 5.40 mill/mm3    Hemoglobin 14.0 12.0 - 16.0 gm/dl    Hematocrit 41.9 37.0 - 47.0 %    MCV 97.4 81.0 - 99.0 fL    MCH 32.6 26.0 - 33.0 pg    MCHC 33.4 32.2 - 35.5 gm/dl    RDW-CV 12.3 11.5 - 14.5 %    RDW-SD 44.0 35.0 - 45.0 fL    Platelets 713 325 - 381 thou/mm3    MPV 9.6 9.4 - 12.4 fL    Seg Neutrophils 41.3 %    Lymphocytes 45.2 %    Monocytes 10.5 %    Eosinophils 2.1 %    Basophils 0.6 %    Immature Granulocytes 0.3 %    Segs Absolute 3.0 1.8 - 7.7 thou/mm3    Lymphocytes Absolute 3.3 1.0 - 4.8 thou/mm3    Monocytes Absolute 0.8 0.4 - 1.3 thou/mm3    Eosinophils Absolute 0.2 0.0 - 0.4 thou/mm3    Basophils Absolute 0.0 0.0 - 0.1 thou/mm3    Immature Grans ALPRAZolam (XANAX) 1 MG tablet  Take 1 mg by mouth nightly as needed for Anxiety             Ascorbic Acid (VITAMIN C) 1000 MG tablet  Take 1,000 mg by mouth daily             aspirin 81 MG tablet  Take 81 mg by mouth 2 times daily              atorvastatin (LIPITOR) 80 MG tablet  Take 1 tablet by mouth daily             Biotin 1000 MCG TABS  Take 1,000 mcg by mouth daily              Cholecalciferol (VITAMIN D3) 50 MCG (2000 UT) CAPS  Take 2,000 Units by mouth 2 times daily             cyclobenzaprine (FLEXERIL) 10 MG tablet  Take 10 mg by mouth 3 times daily as needed for Muscle spasms             DHA-EPA-Coenzyme Q10-Vitamin E (COQ-10 & FISH OIL PO)  Take 1 capsule by mouth daily              furosemide (LASIX) 20 MG tablet  Take 20 mg by mouth 2 times daily              HYDROcodone-acetaminophen (NORCO) 5-325 MG per tablet  Take 1 tablet by mouth every 6 hours as needed for Pain .             isosorbide mononitrate (IMDUR) 60 MG extended release tablet  Take 1 tablet by mouth daily             metoprolol tartrate (LOPRESSOR) 25 MG tablet  Take 1 tablet by mouth 2 times daily             Multiple Vitamins-Minerals (CENTRUM SILVER 50+WOMEN) TABS  Take by mouth daily             nitroGLYCERIN (NITROSTAT) 0.4 MG SL tablet  Place 1 tablet under the tongue every 5 minutes as needed for Chest pain up to max of 3 total doses. If no relief after 1 dose, call 911.              vitamin B-12 (CYANOCOBALAMIN) 1000 MCG tablet  Take 1,000 mcg by mouth daily                 Discharge Medications for PCI/MI (performed or attempted):   ASA:   Yes    Statin:   Yes  ACE/ARB:  No because of hypotension   P2Y12 Inhibitor:  No because patient needs CABG   Beta Blocker:   Yes  Nitro SL:   Yes   Cardiac Rehab:  Patient will undergo CABG in [address] [place] [address]:  Yes     Time Spent:- 35 minutes    Electronically signed by Esau Loya MD on 11/4/2020 at 4:58 PM  Discharging Hospitalist

## 2020-11-04 NOTE — TELEPHONE ENCOUNTER
Ask pt if she takes NSAIDS frequently,  Elavil, Trazodone,  Quinolone antibiotics Levaquin, Buspar Zoloft, or Seroquel recently.  These can cause false positives for Meth

## 2020-11-05 NOTE — PROGRESS NOTES
00:08- [facility] arrived to transport patient to [facility]. Transport papers, last dose [facility] and printed copy of chart given to L[facility] to take with patient. Telemetry monitor removed from patient. Left floor in stable condition.
20:07- Report called to [facility] at [facility]. Notified RN of patient's planned  time at 23:30.
Patient arrived to [facility] from ED by wheelchair. Patient experiencing no chest pain at this time. Two person skin assessment performed by Ashish Block RN and [facility]. Patient resting comfortably in bed.
Pharmacy Medication History Note      List of current medications patient is taking is complete. Source of information: Surescript, Patient    Changes made to medication list:  Medications removed (include reason, ex. therapy complete or physician discontinued):  Removed Calcium/Vit D 250-150 mg unit tab  Removed Omega-3 350 mg CPDR    Medications added/doses adjusted:  Adjusted Aspirin dose    Added Calcium supplementation - dose unspecified  Added Vitamin D3 2000 units    Other notes (ex. Recent course of antibiotics, Coumadin dosing):  Pt reports use of Nitroglycerin SL tablets 11/2/20 and prior use  Instructed pt to take vit d supplements with food  Denies use of other OTC or herbal medications.       Allergies reviewed      Electronically signed by Arsalan Fisehr on 11/3/2020 at 11:36 AM
alert, oriented, normal speech, no focal findings or movement disorder noted  Left groin - no hematoma, +DP, +PT    Medications:    vitamin C  1,000 mg Oral Daily    atorvastatin  80 mg Oral Daily    furosemide  20 mg Oral BID    Icosapent Ethyl  2 capsule Oral BID    isosorbide mononitrate  60 mg Oral Daily    metoprolol tartrate  25 mg Oral BID    prasugrel  10 mg Oral Daily    vitamin B-12  1,000 mcg Oral Daily    sodium chloride flush  10 mL Intravenous 2 times per day    aspirin  81 mg Oral Daily    enoxaparin  40 mg Subcutaneous Daily       ALPRAZolam, 1 mg, Nightly PRN  cyclobenzaprine, 10 mg, TID PRN  hydrOXYzine, 10 mg, TID PRN  HYDROcodone-acetaminophen, 1 tablet, Q6H PRN  sodium chloride flush, 10 mL, PRN  acetaminophen, 650 mg, Q6H PRN    Or  acetaminophen, 650 mg, Q6H PRN  promethazine, 12.5 mg, Q6H PRN    Or  ondansetron, 4 mg, Q6H PRN  potassium chloride, 40 mEq, PRN    Or  potassium alternative oral replacement, 40 mEq, PRN    Or  potassium chloride, 10 mEq, PRN  magnesium sulfate, 2 g, PRN  bisacodyl, 5 mg, Daily PRN  nitroGLYCERIN, 0.4 mg, Q5 Min PRN      Lab Data:    Cardiac Enzymes:  No results for input(s): CKTOTAL, CKMB, CKMBINDEX, TROPONINI in the last 72 hours.     CBC:   Lab Results   Component Value Date    WBC 6.9 11/04/2020    RBC 4.11 11/04/2020    HGB 13.1 11/04/2020    HCT 40.6 11/04/2020     11/04/2020       CMP:    Lab Results   Component Value Date     11/04/2020    K 4.1 11/04/2020     11/04/2020    CO2 28 11/04/2020    BUN 12 11/04/2020    CREATININE 0.8 11/04/2020    AGRATIO 2.1 12/01/2017    LABGLOM 73 11/04/2020    GLUCOSE 104 11/04/2020    GLUCOSE 119 12/01/2017    CALCIUM 9.6 11/04/2020       Hepatic Function Panel:    Lab Results   Component Value Date    ALKPHOS 82 03/24/2020    ALT 32 03/24/2020    AST 21 03/24/2020    PROT 7.2 03/24/2020    BILITOT 0.5 03/24/2020    BILIDIR <0.2 03/24/2020    LABALBU 4.5 03/24/2020       Magnesium:    Lab

## 2020-11-05 NOTE — PROCEDURES
[address] [place] [address]                            CARDIAC CATHETERIZATION    PATIENT NAME: Meng Arango                 :        1960  MED REC NO:   [id number]                           ROOM:       0021  ACCOUNT NO:   [de-identified]                           ADMIT DATE: 2020  PROVIDER:     Moreno Machuca MD    DATE OF PROCEDURE:  2020    CARDIAC CATHETERIZATION    INDICATION FOR PROCEDURE:  Unstable angina with history of CABG and  recurrent in-stent restenosis requiring brachytherapy and recurrent  stenting of the LAD due to failed LIMA. DESCRIPTION OF PROCEDURE:  After informed consent was obtained from the  patient, she was brought to the cardiac catheterization laboratory and  prepped in sterile fashion. The left femoral artery was chosen as the  primary point of access. Preprocedure timeout was completed. After  infiltration of the left inguinal region with 2% lidocaine using  micropuncture and modified Seldinger technique under fluoroscopic  guidance and ultrasound guidance, I was able to insert a 5-Bahraini sheath  in the left femoral artery. We then performed diagnostic coronary  angiography using 5-Bahraini JL-4, 5-Bahraini JR-4 catheters. CORONARY ANGIOGRAM:  LEFT MAIN:  Patent without any significant obstruction. LAD:  Occluded proximally at the level of the stent. LCX:  Mild luminal irregularities, gives rise to small OM1 and OM2  without any significant obstruction. RCA:  No significant obstruction, gives rise to PDA and PLB without any  significant obstruction. LIMA TO LAD:  Atretic. SVG TO DIAGONAL:  Ostium, body, anastomosis of the SVG to diagonal are  patent. The SVG provides retrograde flow into the LAD, and there is a  subtotal 99% occlusion distal to where the diagonal branch enters into  LAD that supplies antegrade flow into a small LAD. LV:  LVEF is 55% to 60%.   No significant regional wall motion  abnormality. Aortic valve appears tricuspid. No significant aneurysm  or dissection in the visualized portion of the aorta. The LV systolic  pressure is 974. No significant LV to AO systolic gradient. LVEDP is  16. IMMEDIATE COMPLICATIONS:  None. MEDICATIONS:  See EMR. ACCESS:  Manual pressure was used for hemostasis. ESTIMATED BLOOD LOSS:  Less than 10 mL. SUMMARY:  Severe LAD ISR with retrograde perfusion from SVG to diagonal;  preserved LVEF. PLAN:  1. Bedrest.  2.  Optimal medical therapy. 3.  Risk factor management. 4.  Routine access site care. 5.  I also discussed with the patient extensively. She wishes to be  transferred to [facility] for continued management. I  have discussed the case with Dr. Ronnie Fink, who graciously accepted  care of the patient and will continue further management from there. All the above was explained to the patient and the patient's family. They were agreeable and amenable to the above plan.         Dakota Stephenson MD    D: 11/05/2020 6:27:26       T: 11/05/2020 7:12:29     [email or web address]  Job#: [id number]     Doc#: [id number]    CC:

## 2020-12-17 RX ORDER — HYDROCODONE BITARTRATE AND ACETAMINOPHEN 5; 325 MG/1; MG/1
1 TABLET ORAL EVERY 8 HOURS PRN
Qty: 90 TABLET | Refills: 0 | Status: SHIPPED | OUTPATIENT
Start: 2020-12-17 | End: 2021-01-16

## 2021-01-25 RX ORDER — FUROSEMIDE 20 MG/1
20 TABLET ORAL 2 TIMES DAILY
Qty: 180 TABLET | Refills: 2 | Status: SHIPPED | OUTPATIENT
Start: 2021-01-25 | End: 2021-10-04

## 2021-01-27 ENCOUNTER — OFFICE VISIT (OUTPATIENT)
Dept: PHYSICAL MEDICINE AND REHAB | Age: 61
End: 2021-01-27
Payer: MEDICARE

## 2021-01-27 VITALS
WEIGHT: 188 LBS | HEIGHT: 63 IN | TEMPERATURE: 97.4 F | HEART RATE: 84 BPM | SYSTOLIC BLOOD PRESSURE: 136 MMHG | BODY MASS INDEX: 33.31 KG/M2 | DIASTOLIC BLOOD PRESSURE: 74 MMHG

## 2021-01-27 DIAGNOSIS — G89.4 CHRONIC PAIN SYNDROME: ICD-10-CM

## 2021-01-27 DIAGNOSIS — M25.512 CHRONIC LEFT SHOULDER PAIN: ICD-10-CM

## 2021-01-27 DIAGNOSIS — M47.816 LUMBAR SPONDYLOSIS: Primary | ICD-10-CM

## 2021-01-27 DIAGNOSIS — M46.1 SI (SACROILIAC) JOINT INFLAMMATION (HCC): ICD-10-CM

## 2021-01-27 DIAGNOSIS — M19.012 PRIMARY OSTEOARTHRITIS OF LEFT SHOULDER: ICD-10-CM

## 2021-01-27 DIAGNOSIS — M48.061 SPINAL STENOSIS OF LUMBAR REGION, UNSPECIFIED WHETHER NEUROGENIC CLAUDICATION PRESENT: ICD-10-CM

## 2021-01-27 DIAGNOSIS — M79.18 MYOFASCIAL PAIN SYNDROME: ICD-10-CM

## 2021-01-27 DIAGNOSIS — G89.29 CHRONIC LEFT SHOULDER PAIN: ICD-10-CM

## 2021-01-27 DIAGNOSIS — M51.36 DDD (DEGENERATIVE DISC DISEASE), LUMBAR: ICD-10-CM

## 2021-01-27 DIAGNOSIS — M79.7 FIBROMYALGIA: ICD-10-CM

## 2021-01-27 PROCEDURE — 99214 OFFICE O/P EST MOD 30 MIN: CPT | Performed by: NURSE PRACTITIONER

## 2021-01-27 RX ORDER — LIDOCAINE 50 MG/G
1 PATCH TOPICAL DAILY
COMMUNITY

## 2021-01-27 RX ORDER — PRASUGREL 5 MG/1
5 TABLET, FILM COATED ORAL DAILY
COMMUNITY
End: 2021-06-03

## 2021-01-27 RX ORDER — HYDROCODONE BITARTRATE AND ACETAMINOPHEN 5; 325 MG/1; MG/1
1 TABLET ORAL EVERY 6 HOURS PRN
Qty: 120 TABLET | Refills: 0 | Status: SHIPPED | OUTPATIENT
Start: 2021-01-27 | End: 2021-03-15 | Stop reason: SDUPTHER

## 2021-01-27 RX ORDER — ACETAMINOPHEN 160 MG/5ML
15 SUSPENSION, ORAL (FINAL DOSE FORM) ORAL EVERY 4 HOURS PRN
COMMUNITY
End: 2021-06-03

## 2021-01-27 ASSESSMENT — ENCOUNTER SYMPTOMS
RHINORRHEA: 0
NAUSEA: 0
CHEST TIGHTNESS: 0
COLOR CHANGE: 0
DIARRHEA: 0
SORE THROAT: 0
VOMITING: 0
BACK PAIN: 1
CONSTIPATION: 0
PHOTOPHOBIA: 0
SINUS PRESSURE: 0
COUGH: 0
WHEEZING: 0
ABDOMINAL PAIN: 0
SHORTNESS OF BREATH: 0
EYE PAIN: 0

## 2021-01-27 NOTE — PROGRESS NOTES
[facility] [address]  Dept: [phone]  Dept Fax: [phone]: [phone]    Visit Date: 1/27/2021    Functionality Assessment/Goals Worksheet     On a scale of 0 (Does not Interfere) to 10 (Completely Interferes)     1. Which number describes how during the past week pain has interfered with       the following:  A. General Activity:  7  B. Mood: 8  C. Walking Ability:  9  D. Normal Work (Includes both work outside the home and housework):  9  E. Relations with Other People:   8  F. Sleep:   9  G. Enjoyment of Life:   9    2. Patient Prefers to Take their Pain Medications:     [x]  On a regular basis   []  Only when necessary    []  Does not take pain medications    3. What are the Patient's Goals/Expectations for Visiting Pain Management? [x]  Learn about my pain    [x]  Receive Medication   []  Physical Therapy     []  Treat Depression   [x]  Receive Injections    []  Treat Sleep   [x]  Deal with Anxiety and Stress   []  Treat Opoid Dependence/Addiction   [x]  Other:ASA 81mg Effient  Uses a vicks inhaler that will show as alcohol in UDS    HPI:   Leydi Pedersen is a 63 y.o. female is here today for    Chief Complaint: Low back pain and Neck pain  Left shoulder pain. Right  Hip groin pain   HPI   Pt here upset she was sitting in lobby for 45 minutes for check in process. The  did not check her in properly so back office staff did  not know she was here. Apologized several times by staff bit she is still upset cursing and being rude to staff. Asked her not to alk to staff that way. She was also upset  because we questioned her previous UDS + Methamphetamine after research pt uses Vicks Inhaler daily states it helps her anxiety headaches and sinus issues. F/U continues to have low back neck pain right SI right hip groin pain.  She had CABG 11/11/2020 at [facility], still participating in home PT. She continues to have right breast chest area numbness from the surgery and very tender to anterior and lateral right chest. She is unable to lay prone for procedures at this time. She continues to take  Norco. States doesn't work that well. She had left shoulder steroid injection 9/2020 mild relief  Azalia Lazo has had lumbar and cervical TPI 7/2020 with 40% pain relief for 4-6 weeks. She was in PT  Fall 2020 for shoulder pain which she thought was causing iincreased  Shoulder and chest pain so she went to ER and ended up having CABG soon after. Medications reviewed. Patient denies side effects with medications. Patient states she is taking medications as prescribed. Shedenies receiving pain medications from other sources. She complains of any ER visits since last visit. Pain scale with out pain medications or at its worst is 10/10. Pain scale with pain medications or at its best is 3/10. Last dose of [place]  was today  Drug screen reviewed from 11/2020and was appropriate USES VICKS INHALER DAILY  + Meth   Pill count was not completed today and out   Patient does not have naloxone available at home. Patient has not required use of naloxone at home since last office visit. FINDINGS:       There are 5 nonrib-bearing lumbar vertebral bodies.       The lumbar vertebral body heights are maintained. There is minimal grade 1 retrolisthesis of L4 over L5 and L2 over L3.       There is disc space narrowing throughout the lumbar spine. There is no acute compression deformity. There is some anterior endplate spurring from W5-T0. Some facet arthropathy is noted in the lower lumbar spine.       The sacroiliac joints are within normal limits. There are phleboliths in the pelvis.           Impression   Multilevel degenerative changes with no acute fracture.             The patientis allergic to other.     Past Medical History  Irma Oleary  has a past medical history of Arthritis, CAD (coronary artery disease), Fibromyalgia, Heart disease, and Hypertension. Past Surgical History  The patient  has a past surgical history that includes Coronary artery bypass graft ();  section ([address] [place] [address]); Tonsillectomy (); hernia repair (); partial hysterectomy (cervix not removed) (); Coronary angioplasty with stent (, , ); and other surgical history. Family History  This patient's family history includes Arthritis in her father; Heart Disease in her brother and father; Liver Disease in her mother. Social History  Emma  reports that she quit smoking about 6 years ago. Her smoking use included cigarettes. She smoked 0.50 packs per day. She quit smokeless tobacco use about 5 years ago. She reports current alcohol use. She reports that she does not use drugs. Medications    Current Outpatient Medications:     prasugrel (EFFIENT) 5 MG TABS, Take 5 mg by mouth daily, Disp: , Rfl:     lidocaine (LIDODERM) 5 %, Place 1 patch onto the skin daily 12 hours on, 12 hours off., Disp: , Rfl:     acetaminophen (TYLENOL) 160 MG/5ML suspension, Take 15 mg/kg by mouth every 4 hours as needed, Disp: , Rfl:     HYDROcodone-acetaminophen (NORCO) 5-325 MG per tablet, Take 1 tablet by mouth every 6 hours as needed for Pain for up to 30 days. , Disp: 120 tablet, Rfl: 0    furosemide (LASIX) 20 MG tablet, Take 1 tablet by mouth 2 times daily, Disp: 180 tablet, Rfl: 2    Cholecalciferol (VITAMIN D3) 50 MCG (2000 UT) CAPS, Take 2,000 Units by mouth 2 times daily, Disp: , Rfl:     atorvastatin (LIPITOR) 80 MG tablet, Take 1 tablet by mouth daily, Disp: 90 tablet, Rfl: 3    cyclobenzaprine (FLEXERIL) 10 MG tablet, Take 10 mg by mouth 3 times daily as needed for Muscle spasms, Disp: , Rfl:     isosorbide mononitrate (IMDUR) 60 MG extended release tablet, Take 1 tablet by mouth daily, Disp: 90 tablet, Rfl: 3    metoprolol tartrate (LOPRESSOR) 25 MG tablet, Take 1 tablet by mouth 2 times daily, Disp: 180 tablet, Rfl: 3    nitroGLYCERIN (NITROSTAT) 0.4 MG SL tablet, Place 1 tablet under the tongue every 5 minutes as needed for Chest pain up to max of 3 total doses. If no relief after 1 dose, call 911., Disp: 25 tablet, Rfl: 3    Biotin 1000 MCG TABS, Take 1,000 mcg by mouth daily , Disp: , Rfl:     Ascorbic Acid (VITAMIN C) 1000 MG tablet, Take 1,000 mg by mouth daily, Disp: , Rfl:     Multiple Vitamins-Minerals (CENTRUM SILVER 50+WOMEN) TABS, Take by mouth daily, Disp: , Rfl:     ALPRAZolam (XANAX) 1 MG tablet, Take 1 mg by mouth nightly as needed for Anxiety, Disp: , Rfl:     aspirin 81 MG tablet, Take 81 mg by mouth 2 times daily , Disp: , Rfl:     vitamin B-12 (CYANOCOBALAMIN) 1000 MCG tablet, Take 1,000 mcg by mouth daily, Disp: , Rfl:     DHA-EPA-Coenzyme Q10-Vitamin E (COQ-10 & FISH OIL PO), Take 1 capsule by mouth daily , Disp: , Rfl:     Subjective:      Review of Systems   Constitutional: Positive for activity change. Negative for appetite change, chills, diaphoresis, fatigue, fever and unexpected weight change. HENT: Negative for congestion, ear pain, hearing loss, mouth sores, nosebleeds, rhinorrhea, sinus pressure and sore throat. Eyes: Negative for photophobia, pain and visual disturbance. Respiratory: Negative for cough, chest tightness, shortness of breath and wheezing. Cardiovascular: Negative for chest pain and palpitations. HTN  CABG 4 stents October 2015  Had recent CABG  11/112020 at [facility] 34 has right chest breast burning neuropathy. Gastrointestinal: Negative for abdominal pain, constipation, diarrhea, nausea and vomiting. Diverticulitis   Endocrine: Negative for cold intolerance, heat intolerance, polydipsia, polyphagia and polyuria. Genitourinary: Negative for decreased urine volume, difficulty urinating, frequency and hematuria.    Musculoskeletal: Positive for arthralgias, back pain, gait problem, myalgias, neck pain and neck stiffness. Negative for joint swelling. Skin: Negative for color change and rash. Allergic/Immunologic: Negative for food allergies and immunocompromised state. Neurological: Negative for dizziness, tremors, seizures, syncope, facial asymmetry, speech difficulty, weakness, light-headedness, numbness and headaches. Hematological: Does not bruise/bleed easily. Psychiatric/Behavioral: Negative for agitation, behavioral problems, confusion, decreased concentration, dysphoric mood, hallucinations, self-injury, sleep disturbance and suicidal ideas. The patient is nervous/anxious. The patient is not hyperactive. Anxiety  depression        Objective:     Vitals:    01/27/21 1550   BP: 136/74   Site: Left Upper Arm   Position: Sitting   Cuff Size: Large Adult   Pulse: 84   Temp: 97.4 °F (36.3 °C)   TempSrc: Temporal   Weight: 188 lb (85.3 kg)   Height: 5' 3\" (1.6 m)       Physical Exam  Vitals signs and nursing note reviewed. Constitutional:       General: She is not in acute distress. Appearance: She is well-developed. She is not diaphoretic. HENT:      Head: Normocephalic and atraumatic. Right Ear: External ear normal.      Left Ear: External ear normal.      Nose: Nose normal.      Mouth/Throat:      Pharynx: No oropharyngeal exudate. Eyes:      General: No scleral icterus. Right eye: No discharge. Left eye: No discharge. Conjunctiva/sclera: Conjunctivae normal.      Pupils: Pupils are equal, round, and reactive to light. Neck:      Musculoskeletal: Neck supple. Decreased range of motion. Muscular tenderness present. No edema, erythema or neck rigidity. Thyroid: No thyromegaly. Cardiovascular:      Rate and Rhythm: Normal rate and regular rhythm. Heart sounds: Normal heart sounds. No murmur. No friction rub. No gallop.        Comments: CABG  11/11/2020 at [facility]  Right chest breast  Numbness   Pulmonary:      Effort: Pulmonary effort is normal. No respiratory distress. Breath sounds: Normal breath sounds. No wheezing or rales. Chest:      Chest wall: No tenderness. Abdominal:      General: Bowel sounds are normal. There is no distension. Palpations: Abdomen is soft. Tenderness: There is no abdominal tenderness. There is no guarding or rebound. Musculoskeletal:         General: Tenderness present. Right shoulder: She exhibits tenderness. Left shoulder: She exhibits decreased range of motion, tenderness, bony tenderness, pain, spasm and decreased strength. Right wrist: She exhibits tenderness. Left wrist: She exhibits tenderness. Right hip: She exhibits tenderness and bony tenderness. Left hip: She exhibits tenderness and bony tenderness. Right knee: She exhibits bony tenderness. Tenderness found. Left knee: She exhibits bony tenderness. Tenderness found. Right ankle: Tenderness. Left ankle: Tenderness. Cervical back: She exhibits decreased range of motion, tenderness, bony tenderness, pain and spasm. Thoracic back: She exhibits tenderness, bony tenderness, pain and spasm. Lumbar back: She exhibits decreased range of motion, tenderness, bony tenderness, pain and spasm. Back:       Right hand: She exhibits decreased range of motion and tenderness. Decreased strength noted. Left hand: She exhibits decreased range of motion, tenderness and bony tenderness. Decreased sensation noted. Decreased strength noted. Right upper leg: She exhibits tenderness. Left upper leg: She exhibits tenderness. Right foot: Tenderness present. Left foot: Tenderness present. Comments: Trigger finger injections in past   Skin:     General: Skin is warm. Coloration: Skin is not pale. Findings: No erythema or rash. Neurological:      Mental Status: She is alert and oriented to person, place, and time. She is not disoriented.       Cranial Nerves: Cranial nerves are intact. No cranial nerve deficit. Sensory: Sensation is intact. No sensory deficit. Motor: Motor function is intact. No atrophy or abnormal muscle tone. Coordination: Coordination is intact. Coordination normal.      Gait: Gait is intact. Gait normal.      Deep Tendon Reflexes: Reflexes are normal and symmetric. Babinski sign absent on the right side. Reflex Scores:       Tricep reflexes are 2+ on the right side and 2+ on the left side. Bicep reflexes are 2+ on the right side and 2+ on the left side. Brachioradialis reflexes are 2+ on the right side and 2+ on the left side. Patellar reflexes are 2+ on the right side and 2+ on the left side. Achilles reflexes are 2+ on the right side and 2+ on the left side. Comments: Motor 4/5 LLE 4/5 BUE  SLR-   Psychiatric:         Attention and Perception: Attention and perception normal. She is attentive. Mood and Affect: Mood and affect normal. Mood is not anxious or depressed. Affect is not labile, blunt, angry or inappropriate. Speech: Speech normal. She is communicative. Speech is not rapid and pressured, delayed, slurred or tangential.         Behavior: Behavior normal. Behavior is not agitated, slowed, aggressive, withdrawn, hyperactive or combative. Behavior is cooperative. Thought Content: Thought content normal. Thought content is not paranoid or delusional. Thought content does not include homicidal or suicidal ideation. Thought content does not include homicidal or suicidal plan. Cognition and Memory: Cognition and memory normal. Memory is not impaired. She does not exhibit impaired recent memory or impaired remote memory. Judgment: Judgment normal. Judgment is not impulsive or inappropriate. Comments:  anxiety depression        HARIKA test: +  Yeomans test:+  Gaenslen test: +     Assessment:     1. Lumbar spondylosis    2.  Primary osteoarthritis of left shoulder 3. Chronic left shoulder pain    4. Fibromyalgia    5. Myofascial pain syndrome    6. Spinal stenosis of lumbar region, unspecified whether neurogenic claudication present    7. DDD (degenerative disc disease), lumbar    8. SI (sacroiliac) joint inflammation (HCC)    9. Chronic pain syndrome            Plan:      · OARRS reviewed. Current MED: 15  · Patient was not offered naloxone for home. · Discussed long term side effects of medications, tolerance, dependency and addiction. · Previous UDS reviewed  · UDS preformed today for compliance. · Patient told can not receive any pain medications from any other source. · No evidence of abuse, diversion or aberrant behavior.  Medications and/or procedures to improve function and quality of life- patient understanding with this and that may not be pain free   Discussed with patient about safe storage of medications at home   Discussed possible weaning of medication dosing dependent on treatment/procedure results.  Testing: reviewed Lumbar XR, discussed hip XR and Lumbar MRI to further eval .   Procedures: Discussed LESI  SI MBB Lumbar Facet MBB if need when able to lay prone.  Discussed with patient about risks with procedure including infection, reaction to medication, increased pain, or bleeding.  Medications:Lidoderm  Patches  Norco    If patient is on  prescribed blood thinners will need Pre Op Clearance per Cardiologist or PCP to hold all blood thinners prior to procedure for the required time frame, this is to be completed by the nursing staff during the scheduling process. Meds. Prescribed:   Orders Placed This Encounter   Medications    HYDROcodone-acetaminophen (NORCO) 5-325 MG per tablet     Sig: Take 1 tablet by mouth every 6 hours as needed for Pain for up to 30 days. Dispense:  120 tablet     Refill:  0     Reduce doses taken as pain becomes manageable       Return in about 2 months (around 3/27/2021) for Follow up pain medications. Electronically signed by LEIGHA Tineo CNP on1/27/2021 at 8:11 PM

## 2021-03-15 ENCOUNTER — TELEPHONE (OUTPATIENT)
Dept: PHYSICAL MEDICINE AND REHAB | Age: 61
End: 2021-03-15

## 2021-03-15 DIAGNOSIS — M79.18 MYOFASCIAL PAIN SYNDROME: ICD-10-CM

## 2021-03-15 DIAGNOSIS — M46.1 SI (SACROILIAC) JOINT INFLAMMATION (HCC): ICD-10-CM

## 2021-03-15 DIAGNOSIS — G89.4 CHRONIC PAIN SYNDROME: ICD-10-CM

## 2021-03-15 DIAGNOSIS — M25.512 CHRONIC LEFT SHOULDER PAIN: ICD-10-CM

## 2021-03-15 DIAGNOSIS — G89.29 CHRONIC LEFT SHOULDER PAIN: ICD-10-CM

## 2021-03-15 DIAGNOSIS — M47.816 LUMBAR SPONDYLOSIS: ICD-10-CM

## 2021-03-15 DIAGNOSIS — M51.36 DDD (DEGENERATIVE DISC DISEASE), LUMBAR: ICD-10-CM

## 2021-03-15 DIAGNOSIS — M79.7 FIBROMYALGIA: ICD-10-CM

## 2021-03-15 DIAGNOSIS — M19.012 PRIMARY OSTEOARTHRITIS OF LEFT SHOULDER: ICD-10-CM

## 2021-03-15 DIAGNOSIS — M48.061 SPINAL STENOSIS OF LUMBAR REGION, UNSPECIFIED WHETHER NEUROGENIC CLAUDICATION PRESENT: ICD-10-CM

## 2021-03-15 RX ORDER — HYDROCODONE BITARTRATE AND ACETAMINOPHEN 5; 325 MG/1; MG/1
1 TABLET ORAL EVERY 6 HOURS PRN
Qty: 120 TABLET | Refills: 0 | Status: SHIPPED | OUTPATIENT
Start: 2021-03-15 | End: 2021-04-19 | Stop reason: SDUPTHER

## 2021-03-15 NOTE — TELEPHONE ENCOUNTER
OARRS reviewed. UDS: + for  Xanax hydrocodone methamphetamine=Vicks inhaler consistent. Last seen: 1/27/2021.  Follow-up:   Future Appointments   Date Time Provider Rasheeda Stewart   3/18/2021  9:00 AM Annika Vela MD [facility]   3/24/2021  9:20 AM [facility] LEIGHA Coello CNP [facility] Pain [facility]

## 2021-03-15 NOTE — TELEPHONE ENCOUNTER
Spoke via phone and informed her Dr Abida Moncada is typically booked 3-5 weeks for procedures, and pending insurance approval, it is not possible to schedule procedure before follow up.  Verbalizes understanding

## 2021-03-15 NOTE — TELEPHONE ENCOUNTER
German Watts called requesting a refill on the following medications:  Requested Prescriptions     Pending Prescriptions Disp Refills    HYDROcodone-acetaminophen (NORCO) 5-325 MG per tablet 120 tablet 0     Sig: Take 1 tablet by mouth every 6 hours as needed for Pain for up to 30 days.      Pharmacy verified:  [facility]    Date of last visit: 01/27/2021  Date of next visit (if applicable): 1/18/3295

## 2021-03-24 ENCOUNTER — OFFICE VISIT (OUTPATIENT)
Dept: PHYSICAL MEDICINE AND REHAB | Age: 61
End: 2021-03-24
Payer: MEDICARE

## 2021-03-24 VITALS
BODY MASS INDEX: 33.31 KG/M2 | HEIGHT: 63 IN | DIASTOLIC BLOOD PRESSURE: 70 MMHG | SYSTOLIC BLOOD PRESSURE: 116 MMHG | WEIGHT: 188 LBS | HEART RATE: 72 BPM

## 2021-03-24 DIAGNOSIS — M47.816 LUMBAR SPONDYLOSIS: ICD-10-CM

## 2021-03-24 DIAGNOSIS — G89.4 CHRONIC PAIN SYNDROME: ICD-10-CM

## 2021-03-24 DIAGNOSIS — M79.18 MYOFASCIAL PAIN SYNDROME: ICD-10-CM

## 2021-03-24 DIAGNOSIS — M19.012 PRIMARY OSTEOARTHRITIS OF LEFT SHOULDER: Primary | ICD-10-CM

## 2021-03-24 DIAGNOSIS — M54.2 CERVICAL PAIN: ICD-10-CM

## 2021-03-24 DIAGNOSIS — M51.36 DDD (DEGENERATIVE DISC DISEASE), LUMBAR: ICD-10-CM

## 2021-03-24 DIAGNOSIS — M25.512 CHRONIC LEFT SHOULDER PAIN: ICD-10-CM

## 2021-03-24 DIAGNOSIS — M75.52 CHRONIC SHOULDER BURSITIS, LEFT: ICD-10-CM

## 2021-03-24 DIAGNOSIS — M46.1 SI (SACROILIAC) JOINT INFLAMMATION (HCC): ICD-10-CM

## 2021-03-24 DIAGNOSIS — M75.42 SHOULDER IMPINGEMENT SYNDROME, LEFT: ICD-10-CM

## 2021-03-24 DIAGNOSIS — M48.061 SPINAL STENOSIS OF LUMBAR REGION, UNSPECIFIED WHETHER NEUROGENIC CLAUDICATION PRESENT: ICD-10-CM

## 2021-03-24 DIAGNOSIS — G89.29 CHRONIC LEFT SHOULDER PAIN: ICD-10-CM

## 2021-03-24 DIAGNOSIS — M79.7 FIBROMYALGIA: ICD-10-CM

## 2021-03-24 PROCEDURE — 99214 OFFICE O/P EST MOD 30 MIN: CPT | Performed by: NURSE PRACTITIONER

## 2021-03-24 RX ORDER — CLOPIDOGREL BISULFATE 75 MG/1
75 TABLET ORAL DAILY
COMMUNITY

## 2021-03-24 ASSESSMENT — ENCOUNTER SYMPTOMS
EYE PAIN: 0
SINUS PRESSURE: 0
ABDOMINAL PAIN: 0
SORE THROAT: 0
SHORTNESS OF BREATH: 0
DIARRHEA: 0
VOMITING: 0
BACK PAIN: 1
CHEST TIGHTNESS: 0
PHOTOPHOBIA: 0
RHINORRHEA: 0
CONSTIPATION: 0
NAUSEA: 0
WHEEZING: 0
COUGH: 0
COLOR CHANGE: 0

## 2021-03-24 NOTE — PROGRESS NOTES
[facility] [address]  Dept: [phone]  Dept Fax: [phone]: [phone]    Visit Date: 3/24/2021    Functionality Assessment/Goals Worksheet     On a scale of 0 (Does not Interfere) to 10 (Completely Interferes)     1. Which number describes how during the past week pain has interfered with       the following:  A. General Activity:  5  B. Mood: 3  C. Walking Ability:  6  D. Normal Work (Includes both work outside the home and housework):  6  E. Relations with Other People:   1  F. Sleep:   5  G. Enjoyment of Life:   5    2. Patient Prefers to Take their Pain Medications:     []  On a regular basis   [x]  Only when necessary    []  Does not take pain medications    3. What are the Patient's Goals/Expectations for Visiting Pain Management? [x]  Learn about my pain    []  Receive Medication   []  Physical Therapy     []  Treat Depression   []  Receive Injections    []  Treat Sleep   []  Deal with Anxiety and Stress   []  Treat Opoid Dependence/Addiction   []  Other:    HPI:   Nguyễn Castaneda is a 63 y.o. female is here today for    Chief Complaint: Low back pain, Mid back pain, Neck pain and Hip pain  Shoulder   HPI   F/U continues to have neck left shoulder right hip  Right SI  Pain. She had CABG  11/11/2020 and is finished with Cardiac Rehab now and continues HEP. She has a consult with Dr Juan Du next week for her low back pain. Her main pain complaint is her left shoulder. She has increased pain with reaching, raising arm, lifting, pushing and pulling. She has had steroid injection to shoulder in past and TPI injections with 40% pain relief for 4-6 weeks but then had CABG surgery. She continues to take Norco.   Medications reviewed. Patient denies side effects with medications. Patient states she is taking medications as prescribed.  Lees receiving pain medications from other sources. She denies any ER visits since last visit. Pain scale with out pain medications or at its worst is 10/10. Pain scale with pain medications or at its best is 2/10. Last dose of  [place]  was today  Drug screen reviewed from 1/2021 and was WNL   Pill count was completed today and was appropriate  Patient does not have naloxone available at home. Patient has not required use of naloxone at home since last office visit. The patientis allergic to other. Subjective:      Review of Systems   Constitutional: Positive for activity change. Negative for appetite change, chills, diaphoresis, fatigue, fever and unexpected weight change. HENT: Negative for congestion, ear pain, hearing loss, mouth sores, nosebleeds, rhinorrhea, sinus pressure and sore throat. Eyes: Negative for photophobia, pain and visual disturbance. Respiratory: Negative for cough, chest tightness, shortness of breath and wheezing. Cardiovascular: Negative for chest pain and palpitations. HTN  CABG 4 stents October 2015  Had recent CABG  11/112020 at [facility] 34 has right chest breast burning neuropathy. Gastrointestinal: Negative for abdominal pain, constipation, diarrhea, nausea and vomiting. Diverticulitis   Endocrine: Negative for cold intolerance, heat intolerance, polydipsia, polyphagia and polyuria. Genitourinary: Negative for decreased urine volume, difficulty urinating, frequency and hematuria. Musculoskeletal: Positive for arthralgias, back pain, gait problem, myalgias, neck pain and neck stiffness. Negative for joint swelling. Skin: Negative for color change and rash. Allergic/Immunologic: Negative for food allergies and immunocompromised state. Neurological: Negative for dizziness, tremors, seizures, syncope, facial asymmetry, speech difficulty, weakness, light-headedness, numbness and headaches. Hematological: Does not bruise/bleed easily. Psychiatric/Behavioral: Negative for agitation, behavioral problems, confusion, decreased concentration, dysphoric mood, hallucinations, self-injury, sleep disturbance and suicidal ideas. The patient is nervous/anxious. The patient is not hyperactive. Anxiety  depression        Objective:     Vitals:    03/24/21 0918   BP: 116/70   Site: Left Upper Arm   Position: Sitting   Cuff Size: Large Adult   Pulse: 72   Weight: 188 lb (85.3 kg)   Height: 5' 3\" (1.6 m)       Physical Exam  Vitals signs and nursing note reviewed. Constitutional:       General: She is not in acute distress. Appearance: She is well-developed. She is not diaphoretic. HENT:      Head: Normocephalic and atraumatic. Right Ear: External ear normal.      Left Ear: External ear normal.      Nose: Nose normal.      Mouth/Throat:      Pharynx: No oropharyngeal exudate. Eyes:      General: No scleral icterus. Right eye: No discharge. Left eye: No discharge. Conjunctiva/sclera: Conjunctivae normal.      Pupils: Pupils are equal, round, and reactive to light. Neck:      Musculoskeletal: Neck supple. Decreased range of motion. Muscular tenderness present. No edema, erythema or neck rigidity. Thyroid: No thyromegaly. Cardiovascular:      Rate and Rhythm: Normal rate and regular rhythm. Heart sounds: Normal heart sounds. No murmur. No friction rub. No gallop. Comments: CABG  11/11/2020 at [facility]  Right chest breast  Numbness   Pulmonary:      Effort: Pulmonary effort is normal. No respiratory distress. Breath sounds: Normal breath sounds. No wheezing or rales. Chest:      Chest wall: No tenderness. Abdominal:      General: Bowel sounds are normal. There is no distension. Palpations: Abdomen is soft. Tenderness: There is no abdominal tenderness. There is no guarding or rebound. Musculoskeletal:         General: Tenderness present.       Right shoulder: She exhibits tenderness. Left shoulder: She exhibits decreased range of motion, tenderness, bony tenderness, pain, spasm and decreased strength. Left elbow: Tenderness found. Right wrist: She exhibits tenderness. Left wrist: She exhibits tenderness. Right hip: She exhibits tenderness and bony tenderness. Left hip: She exhibits tenderness and bony tenderness. Right knee: She exhibits bony tenderness. Tenderness found. Left knee: She exhibits bony tenderness. Tenderness found. Right ankle: Tenderness. Left ankle: Tenderness. Cervical back: She exhibits decreased range of motion, tenderness, bony tenderness, pain and spasm. Thoracic back: She exhibits tenderness, bony tenderness, pain and spasm. Lumbar back: She exhibits decreased range of motion, tenderness, bony tenderness, pain and spasm. Back:       Right hand: She exhibits decreased range of motion and tenderness. Decreased strength noted. Left hand: She exhibits decreased range of motion, tenderness and bony tenderness. Decreased sensation noted. Decreased strength noted. Right upper leg: She exhibits tenderness. Left upper leg: She exhibits tenderness. Right foot: Tenderness present. Left foot: Tenderness present. Comments: Trigger finger injections in past   Skin:     General: Skin is warm. Coloration: Skin is not pale. Findings: No erythema or rash. Neurological:      Mental Status: She is alert and oriented to person, place, and time. She is not disoriented. Cranial Nerves: Cranial nerves are intact. No cranial nerve deficit. Sensory: Sensation is intact. No sensory deficit. Motor: Motor function is intact. No atrophy or abnormal muscle tone. Coordination: Coordination is intact. Coordination normal.      Gait: Gait is intact. Gait normal.      Deep Tendon Reflexes: Reflexes are normal and symmetric. Babinski sign absent on the right side. 12. Cervical pain            Plan:      · OARRS reviewed. Current MED:26  · Patient was not offered naloxone for home. · Discussed long term side effects of medications, tolerance, dependency and addiction. · Previous UDS reviewed  · UDS preformed today for compliance. · Patient told can not receive any pain medications from any other source. · No evidence of abuse, diversion or aberrant behavior.  Medications and/or procedures to improve function and quality of life- patient understanding with this and that may not be pain free   Discussed with patient about safe storage of medications at home   Discussed possible weaning of medication dosing dependent on treatment/procedure results.  Testing: none   Procedures: none has Dr Raz Reddy consult March 30,2021   Discussed with patient about risks with procedure including infection, reaction to medication, increased pain, or bleeding.  Medications:Norco Flexeril    If patient is on blood thinners will need approval to hold [facility] Dr Yoselin Hector to perform  left shoulder steroid injection , HEP reviewed to help strengthen  Shoulder. Meds. Prescribed:   No orders of the defined types were placed in this encounter. Return in about 2 months (around 5/24/2021) for Follow up pain medications.                Electronically signed by LEIGHA Galeas CNP on3/24/2021 at 9:39 AM

## 2021-04-19 DIAGNOSIS — M79.7 FIBROMYALGIA: ICD-10-CM

## 2021-04-19 DIAGNOSIS — M46.1 SI (SACROILIAC) JOINT INFLAMMATION (HCC): ICD-10-CM

## 2021-04-19 DIAGNOSIS — M51.36 DDD (DEGENERATIVE DISC DISEASE), LUMBAR: ICD-10-CM

## 2021-04-19 DIAGNOSIS — M79.18 MYOFASCIAL PAIN SYNDROME: ICD-10-CM

## 2021-04-19 DIAGNOSIS — M47.816 LUMBAR SPONDYLOSIS: ICD-10-CM

## 2021-04-19 DIAGNOSIS — M25.512 CHRONIC LEFT SHOULDER PAIN: ICD-10-CM

## 2021-04-19 DIAGNOSIS — M48.061 SPINAL STENOSIS OF LUMBAR REGION, UNSPECIFIED WHETHER NEUROGENIC CLAUDICATION PRESENT: ICD-10-CM

## 2021-04-19 DIAGNOSIS — G89.29 CHRONIC LEFT SHOULDER PAIN: ICD-10-CM

## 2021-04-19 DIAGNOSIS — G89.4 CHRONIC PAIN SYNDROME: ICD-10-CM

## 2021-04-19 DIAGNOSIS — M19.012 PRIMARY OSTEOARTHRITIS OF LEFT SHOULDER: ICD-10-CM

## 2021-04-19 RX ORDER — HYDROCODONE BITARTRATE AND ACETAMINOPHEN 5; 325 MG/1; MG/1
1 TABLET ORAL EVERY 6 HOURS PRN
Qty: 120 TABLET | Refills: 0 | Status: SHIPPED | OUTPATIENT
Start: 2021-04-19 | End: 2021-05-19

## 2021-04-19 NOTE — TELEPHONE ENCOUNTER
Carrol Rowley called requesting a refill on the following medications:  Requested Prescriptions     Pending Prescriptions Disp Refills    HYDROcodone-acetaminophen (NORCO) 5-325 MG per tablet 120 tablet 0     Sig: Take 1 tablet by mouth every 6 hours as needed for Pain for up to 30 days.      Pharmacy [facility]: [facility]      Date of last visit: 3/24/21  Date of next visit (if applicable): 3/00/9472

## 2021-04-19 NOTE — TELEPHONE ENCOUNTER
OARRS reviewed. UDS: + for Alpha-Hydroxyalprazolam, Alprazolam, Methamphetamine, Dihydrocodeine, Hydrocodone, Norhydrocodone,   Last seen: 3/24/2021.  Follow-up: 5/26/21

## 2021-05-26 ENCOUNTER — OFFICE VISIT (OUTPATIENT)
Dept: PHYSICAL MEDICINE AND REHAB | Age: 61
End: 2021-05-26
Payer: MEDICARE

## 2021-05-26 VITALS
WEIGHT: 187 LBS | HEIGHT: 63 IN | SYSTOLIC BLOOD PRESSURE: 118 MMHG | BODY MASS INDEX: 33.13 KG/M2 | HEART RATE: 72 BPM | DIASTOLIC BLOOD PRESSURE: 68 MMHG

## 2021-05-26 DIAGNOSIS — M51.36 DDD (DEGENERATIVE DISC DISEASE), LUMBAR: ICD-10-CM

## 2021-05-26 DIAGNOSIS — M79.7 FIBROMYALGIA: ICD-10-CM

## 2021-05-26 DIAGNOSIS — G89.4 CHRONIC PAIN SYNDROME: ICD-10-CM

## 2021-05-26 DIAGNOSIS — M79.18 MYOFASCIAL PAIN SYNDROME: ICD-10-CM

## 2021-05-26 DIAGNOSIS — M19.012 PRIMARY OSTEOARTHRITIS OF LEFT SHOULDER: ICD-10-CM

## 2021-05-26 DIAGNOSIS — M46.1 SI (SACROILIAC) JOINT INFLAMMATION (HCC): ICD-10-CM

## 2021-05-26 DIAGNOSIS — M54.2 CERVICAL PAIN: ICD-10-CM

## 2021-05-26 DIAGNOSIS — M48.061 SPINAL STENOSIS OF LUMBAR REGION, UNSPECIFIED WHETHER NEUROGENIC CLAUDICATION PRESENT: ICD-10-CM

## 2021-05-26 DIAGNOSIS — M75.42 SHOULDER IMPINGEMENT SYNDROME, LEFT: ICD-10-CM

## 2021-05-26 DIAGNOSIS — M75.52 CHRONIC SHOULDER BURSITIS, LEFT: ICD-10-CM

## 2021-05-26 DIAGNOSIS — M47.816 LUMBAR SPONDYLOSIS: Primary | ICD-10-CM

## 2021-05-26 DIAGNOSIS — M25.512 CHRONIC LEFT SHOULDER PAIN: ICD-10-CM

## 2021-05-26 DIAGNOSIS — G89.29 CHRONIC LEFT SHOULDER PAIN: ICD-10-CM

## 2021-05-26 DIAGNOSIS — M48.07 LUMBOSACRAL SPINAL STENOSIS: ICD-10-CM

## 2021-05-26 DIAGNOSIS — F11.90 CHRONIC, CONTINUOUS USE OF OPIOIDS: ICD-10-CM

## 2021-05-26 DIAGNOSIS — M54.81 BILATERAL OCCIPITAL NEURALGIA: ICD-10-CM

## 2021-05-26 DIAGNOSIS — M54.17 LUMBOSACRAL RADICULITIS: ICD-10-CM

## 2021-05-26 PROCEDURE — 99214 OFFICE O/P EST MOD 30 MIN: CPT | Performed by: NURSE PRACTITIONER

## 2021-05-26 RX ORDER — HYDROCODONE BITARTRATE AND ACETAMINOPHEN 5; 325 MG/1; MG/1
1 TABLET ORAL EVERY 6 HOURS PRN
Qty: 120 TABLET | Refills: 0 | Status: SHIPPED | OUTPATIENT
Start: 2021-05-26 | End: 2021-07-13 | Stop reason: SDUPTHER

## 2021-05-26 ASSESSMENT — ENCOUNTER SYMPTOMS
VOMITING: 0
WHEEZING: 0
BACK PAIN: 1
CHEST TIGHTNESS: 0
EYE PAIN: 0
COLOR CHANGE: 0
COUGH: 0
SINUS PRESSURE: 0
PHOTOPHOBIA: 0
RHINORRHEA: 0
NAUSEA: 0
ABDOMINAL PAIN: 0
SORE THROAT: 0
DIARRHEA: 0
CONSTIPATION: 0
SHORTNESS OF BREATH: 0

## 2021-05-26 NOTE — PROGRESS NOTES
[address] [facility] [address]  Dept: [phone]  Dept Fax: [phone]: [phone]    Visit Date: 5/26/2021    Functionality Assessment/Goals Worksheet     On a scale of 0 (Does not Interfere) to 10 (Completely Interferes)     1. Which number describes how during the past week pain has interfered with       the following:  A. General Activity:  7  B. Mood: 5  C. Walking Ability:  6  D. Normal Work (Includes both work outside the home and housework):  4  E. Relations with Other People:   8  F. Sleep:   5  G. Enjoyment of Life:   6    2. Patient Prefers to Take their Pain Medications:     []  On a regular basis   [x]  Only when necessary    []  Does not take pain medications    3. What are the Patient's Goals/Expectations for Visiting Pain Management? []  Learn about my pain    [x]  Receive Medication   []  Physical Therapy     []  Treat Depression   [x]  Receive Injections    []  Treat Sleep   []  Deal with Anxiety and Stress   []  Treat Opoid Dependence/Addiction   []  Other:    HPI:   Rosario Mcwilliams is a 63 y.o. female is here today for    Chief Complaint: Low back pain, Mid back pain, Neck pain, Right leg pain, Left leg pain and Hip pain left shoulder  SI pain     HPI   F/U left shoulder steroid injection 3/24/2021  95% pain relief or benefit so far. She seen Dr Rodolfo Taylor for lumbar pain consult. He ordered MRI pending. Recent heart cath and EGD due to GERD and ulcers. She had CABG  11/11/2020 and is finished with Cardiac Rehab now and continues HEP. She has had steroid injection to shoulder in past and TPI injections with 40% pain relief for 4-6 weeks but then had CABG surgery.   She continues to take Norco. Flexeril Lidoderm    Pain increases with bending, lifting, twisting , turning torso, reaching, pushing, pulling, turning head, walking, standing, raising arms and stairs. PT, ice, heat, Chiropractor, NSAIDS, narcotics, muscle relaxer, OTC rubs creams patches, steroid burst and injections sleep member bed inclined   sharp, shooting, stabbing, burning, throbbing and aching    Medications reviewed. Patient denies side effects with medications. Patient states she is taking medications as prescribed. Shedenies receiving pain medications from other sources. She denies any ER visits since last visit. Pain scale with out pain medications or at its worst is 9/10. lifting flower pot. 5-6/10  Is her normal high   Pain scale with pain medications or at its best is 4/10. Last dose of Norco  was yesterday  Drug screen reviewed from 2021 and was appropriate  Pill count was not completed today and out  Patient does not have naloxone available at home. Patient has not required use of naloxone at home since last office visit. XR 2020  FINDINGS:       There are 5 nonrib-bearing lumbar vertebral bodies.       The lumbar vertebral body heights are maintained. There is minimal grade 1 retrolisthesis of L4 over L5 and L2 over L3.       There is disc space narrowing throughout the lumbar spine. There is no acute compression deformity. There is some anterior endplate spurring from G7-Y3. Some facet arthropathy is noted in the lower lumbar spine.       The sacroiliac joints are within normal limits. There are phleboliths in the pelvis.           Impression   Multilevel degenerative changes with no acute fracture. FINDINGS:    LEVEL OF IMAGIN. There is unobscured visualization to the bottom of C7 on the lateral projection.       POSTOPERATIVE CHANGES:    1. CABG.       ALIGNMENT: Anatomic.       MINERALIZATION: Normal.       FRACTURE: None.       DISC SPACES/FACET JOINTS:    1. Small marginal osteophytes at C4-5 with preservation of the disc space height.    2. Moderate facet arthritis at C4-5 on the left.       ANTERIOR ATLANTODENTAL DISTANCE/ATLANTOAXIAL RELATIONSHIP/PREVERTEBRAL SOFT TISSUE:   1.  Unremarkable.       OTHER: None.               Impression   1. There is no acute process. 2. Small marginal osteophytes at C4-5 with preservation of the disc space height. 3. Moderate facet arthritis at C4-5 on the left. The patientis allergic to other. Subjective:      Review of Systems   Constitutional: Positive for activity change. Negative for appetite change, chills, diaphoresis, fatigue, fever and unexpected weight change. HENT: Negative for congestion, ear pain, hearing loss, mouth sores, nosebleeds, rhinorrhea, sinus pressure and sore throat. Eyes: Negative for photophobia, pain and visual disturbance. Respiratory: Negative for cough, chest tightness, shortness of breath and wheezing. Cardiovascular: Negative for chest pain and palpitations. HTN  CABG 4 stents October 2015  Had recent CABG  11/112020 at [facility] 34 has right chest breast burning neuropathy. Recent heart cath April 2021 due to GERD rule pout heart issues   Gastrointestinal: Negative for abdominal pain, constipation, diarrhea, nausea and vomiting. Diverticulitis recent EGD ulcers  GERD   Endocrine: Negative for cold intolerance, heat intolerance, polydipsia, polyphagia and polyuria. Genitourinary: Negative for decreased urine volume, difficulty urinating, frequency and hematuria. Musculoskeletal: Positive for arthralgias, back pain, gait problem, myalgias, neck pain and neck stiffness. Negative for joint swelling. Skin: Negative for color change and rash. Allergic/Immunologic: Negative for food allergies and immunocompromised state. Neurological: Negative for dizziness, tremors, seizures, syncope, facial asymmetry, speech difficulty, weakness, light-headedness, numbness and headaches. Hematological: Does not bruise/bleed easily.    Psychiatric/Behavioral: Negative for agitation, behavioral problems, confusion, decreased concentration, dysphoric mood, hallucinations, self-injury, sleep disturbance and suicidal ideas. The patient is nervous/anxious. The patient is not hyperactive. Anxiety  depression        Objective:     Vitals:    05/26/21 0914   BP: 118/68   Site: Left Upper Arm   Position: Sitting   Cuff Size: Medium Adult   Pulse: 72   Weight: 187 lb (84.8 kg)   Height: 5' 3\" (1.6 m)       Physical Exam  Vitals and nursing note reviewed. Constitutional:       General: She is not in acute distress. Appearance: She is well-developed. She is not diaphoretic. HENT:      Head: Normocephalic and atraumatic. Right Ear: External ear normal.      Left Ear: External ear normal.      Nose: Nose normal.      Mouth/Throat:      Pharynx: No oropharyngeal exudate. Eyes:      General: No scleral icterus. Right eye: No discharge. Left eye: No discharge. Conjunctiva/sclera: Conjunctivae normal.      Pupils: Pupils are equal, round, and reactive to light. Neck:      Thyroid: No thyromegaly. Cardiovascular:      Rate and Rhythm: Normal rate and regular rhythm. Heart sounds: Normal heart sounds. No murmur heard. No friction rub. No gallop. Comments: CABG  11/11/2020 at [facility]  Right chest breast  Numbness   Pulmonary:      Effort: Pulmonary effort is normal. No respiratory distress. Breath sounds: Normal breath sounds. No wheezing or rales. Chest:      Chest wall: No tenderness. Abdominal:      General: Bowel sounds are normal. There is no distension. Palpations: Abdomen is soft. Tenderness: There is no abdominal tenderness. There is no guarding or rebound. Musculoskeletal:         General: Tenderness present. Right shoulder: Tenderness present. Left shoulder: Tenderness and bony tenderness present. Decreased range of motion. Decreased strength. Left elbow: Tenderness present. Right wrist: Tenderness present. Left wrist: Tenderness present.       Right the right side and 2+ on the left side. Achilles reflexes are 2+ on the right side and 2+ on the left side. Comments: Motor 4/5 LLE 4/5 BUE  SLR-   Psychiatric:         Attention and Perception: Attention and perception normal. She is attentive. Mood and Affect: Mood and affect normal. Mood is not anxious or depressed. Affect is not labile, blunt, angry or inappropriate. Speech: Speech normal. She is communicative. Speech is not rapid and pressured, delayed, slurred or tangential.         Behavior: Behavior normal. Behavior is not agitated, slowed, aggressive, withdrawn, hyperactive or combative. Behavior is cooperative. Thought Content: Thought content normal. Thought content is not paranoid or delusional. Thought content does not include homicidal or suicidal ideation. Thought content does not include homicidal or suicidal plan. Cognition and Memory: Cognition and memory normal. Memory is not impaired. She does not exhibit impaired recent memory or impaired remote memory. Judgment: Judgment normal. Judgment is not impulsive or inappropriate. Comments:  anxiety depression        HARIKA test: +  Yeomans test: +  Gaenslen test: +     Assessment:     1. Lumbar spondylosis    2. Primary osteoarthritis of left shoulder    3. Chronic left shoulder pain    4. Fibromyalgia    5. Myofascial pain syndrome    6. Spinal stenosis of lumbar region, unspecified whether neurogenic claudication present    7. DDD (degenerative disc disease), lumbar    8. SI (sacroiliac) joint inflammation (HCC)    9. Chronic pain syndrome    10. Chronic shoulder bursitis, left    11. Shoulder impingement syndrome, left    12. Cervical pain    13. Chronic, continuous use of opioids    14. Lumbosacral spinal stenosis    15. Lumbosacral radiculitis    16. Bilateral occipital neuralgia            Plan:      · OARRS reviewed. Current MED:26  · Patient was not offered naloxone for home.    · Discussed long term side effects of medications, tolerance, dependency and addiction. · Previous UDS reviewed  · UDS preformed today for compliance. · Patient told can not receive any pain medications from any other source. · No evidence of abuse, diversion or aberrant behavior.  Medications and/or procedures to improve function and quality of life- patient understanding with this and that may not be pain free   Discussed with patient about safe storage of medications at home   Discussed possible weaning of medication dosing dependent on treatment/procedure results.  Testing: reviewed Cervical Lumbar XR    Procedures: will wait until has F/U Lumbar MRI with Dr Tova Galaviz.  Discussed with patient about risks with procedure including infection, reaction to medication, increased pain, or bleeding.  Medications:Norco Flexeril Lidoderm    If patient is on blood thinners will need approval to hold ASPIRIN Plavix       Meds. Prescribed:   Orders Placed This Encounter   Medications    HYDROcodone-acetaminophen (NORCO) 5-325 MG per tablet     Sig: Take 1 tablet by mouth every 6 hours as needed for Pain for up to 30 days. Dispense:  120 tablet     Refill:  0     Reduce doses taken as pain becomes manageable       Return for Upper thoracic  cervical TPI with Dr Staci Schmidt  F/U [facility] 2 months after.                Electronically signed by LEIGHA Spence CNP on5/26/2021 at 9:43 AM

## 2021-06-03 ENCOUNTER — OFFICE VISIT (OUTPATIENT)
Dept: CARDIOLOGY CLINIC | Age: 61
End: 2021-06-03
Payer: MEDICARE

## 2021-06-03 VITALS
DIASTOLIC BLOOD PRESSURE: 70 MMHG | SYSTOLIC BLOOD PRESSURE: 136 MMHG | HEIGHT: 63 IN | BODY MASS INDEX: 32.27 KG/M2 | HEART RATE: 68 BPM | WEIGHT: 182.1 LBS

## 2021-06-03 DIAGNOSIS — F41.9 ANXIETY: Primary | ICD-10-CM

## 2021-06-03 DIAGNOSIS — R07.9 CHEST PAIN, UNSPECIFIED TYPE: ICD-10-CM

## 2021-06-03 PROCEDURE — 99213 OFFICE O/P EST LOW 20 MIN: CPT | Performed by: INTERNAL MEDICINE

## 2021-06-03 RX ORDER — PANTOPRAZOLE SODIUM 40 MG/1
40 GRANULE, DELAYED RELEASE ORAL
COMMUNITY

## 2021-06-03 RX ORDER — ALPRAZOLAM 1 MG/1
1 TABLET ORAL NIGHTLY PRN
Qty: 30 TABLET | Refills: 0 | Status: SHIPPED | OUTPATIENT
Start: 2021-06-03 | End: 2021-07-03

## 2021-06-03 RX ORDER — RANOLAZINE 500 MG/1
500 TABLET, EXTENDED RELEASE ORAL 2 TIMES DAILY
Qty: 60 TABLET | Refills: 3 | Status: SHIPPED | OUTPATIENT
Start: 2021-06-03 | End: 2022-05-03

## 2021-06-03 RX ORDER — SUCRALFATE 1 G/1
TABLET ORAL
COMMUNITY
Start: 2021-05-18 | End: 2022-05-03

## 2021-06-03 NOTE — PROGRESS NOTES
[id number] [address] [place] [address]  Dept: [phone]  Dept Fax: [phone]  Loc: [phone]    Visit Date: 6/3/2021    Ms. Margie Toney is a 63 y.o. female  who presented for:  Chief Complaint   Patient presents with    Follow-up       HPI:   Chief complaint: left chest pain 1-2 x per day    Patient is here for follow up after last Heart catheterization that was done at [facility] in [place] on April 11th, 2021. She states she has been having sharp shooting pain that last  month for a few seconds to left mid chest. She says this usually occurs after activity such as using her sweeper at home and lasts for just a few seconds at 10/10. Ning Iyer She states this occurs maybe 1-2 times per day and worse with movement of left arm. Patient was also told she might need GI work up by cardiologist at [facility]. Patient states she doesn't usually take any medications at time of pain but usually it goes away in a few seconds. She is also having some increased anxiety when she is trying to sleep at night. Patient denies any associated shortness of breath, nausea, or weakness. Current Outpatient Medications:     pantoprazole sodium (PROTONIX) 40 MG PACK packet, Take 40 mg by mouth every morning (before breakfast), Disp: , Rfl:     ranolazine (RANEXA) 500 MG extended release tablet, Take 1 tablet by mouth 2 times daily, Disp: 60 tablet, Rfl: 3    ALPRAZolam (XANAX) 1 MG tablet, Take 1 tablet by mouth nightly as needed for Anxiety for up to 30 days. , Disp: 30 tablet, Rfl: 0    HYDROcodone-acetaminophen (NORCO) 5-325 MG per tablet, Take 1 tablet by mouth every 6 hours as needed for Pain for up to 30 days. , Disp: 120 tablet, Rfl: 0    clopidogrel (PLAVIX) 75 MG tablet, Take 75 mg by mouth daily, Disp: , Rfl:     furosemide (LASIX) 20 MG tablet, Take 1 tablet by mouth 2 times daily (Patient taking differently: Take 20 mg by mouth daily place, and time. Appears well-developed and well-nourished. HENT:   Head: Normocephalic and atraumatic. Eyes: EOM are normal. Pupils are equal, round, and reactive to light. Neck: Normal range of motion. Neck supple. No JVD present. Cardiovascular: Normal rate, regular rhythm, normal heart sounds and intact distal pulses. No murmur heard. Pulmonary/Chest: Effort normal and breath sounds normal. No respiratory distress. No wheezes. No rales. Abdominal: Soft. Bowel sounds are normal. No distension. There is no tenderness. Musculoskeletal: Normal range of motion. No edema. Neurological: Alert and oriented to person, place, and time. No cranial nerve deficit. Coordination normal.   Skin: Skin is warm and dry. Psychiatric: Normal mood and affect.        No results found for: CKTOTAL, CKMB, CKMBINDEX    Lab Results   Component Value Date    WBC 6.9 11/04/2020    RBC 4.11 11/04/2020    HGB 13.1 11/04/2020    HCT 40.6 11/04/2020    MCV 98.8 11/04/2020    MCH 31.9 11/04/2020    MCHC 32.3 11/04/2020    RDW 12.9 12/01/2017     11/04/2020    MPV 10.0 11/04/2020       Lab Results   Component Value Date     11/04/2020    K 4.1 11/04/2020     11/04/2020    CO2 28 11/04/2020    BUN 12 11/04/2020    LABALBU 4.5 03/24/2020    CREATININE 0.8 11/04/2020    CALCIUM 9.6 11/04/2020    LABGLOM 73 11/04/2020    GLUCOSE 104 11/04/2020    GLUCOSE 119 12/01/2017       Lab Results   Component Value Date    ALKPHOS 82 03/24/2020    ALT 32 03/24/2020    AST 21 03/24/2020    PROT 7.2 03/24/2020    BILITOT 0.5 03/24/2020    BILIDIR <0.2 03/24/2020    LABALBU 4.5 03/24/2020       Lab Results   Component Value Date    MG 2.2 06/11/2019       No results found for: INR, PROTIME      No results found for: LABA1C    Lab Results   Component Value Date    TRIG 154 11/03/2020    HDL 56 11/03/2020    LDLCALC 31 11/03/2020       No results found for: TSH     4/12/21 [address] CORONARY_ANGIO/FINDINGS   Mild, diffuse disease in Left main   Diffuse disease of 100 % in LAD   Free arterial graft to the LAD is widely patent   Mild, diffuse disease in CIRC   Mild, diffuse disease in RCA   Free arterial graft to the LAD is widely patent   Saphenous vein graft to the 1st Diagonal is widely patent     Ventricular Function:     LV Gram- 60% EF  with normal wall motion   Valve Function:     Valve functions within normal limits. Valvular function not assessed. Dominance:    Right dominant               Assessment/Plan   1.)Chest Pain:  Patient has extensive cardiac history including bypass in 2015, stenting, and recent cardiac catheterization 11/11/20 ([facility]) and 4/11/21 ([facility] in [place]). \  Start Renaxa  Will consider increasing Imdur in future if continuing with chest pain  Instructed patient to contact office if still having symptoms for possible heart catheterization  Instructed patient to go to ED if symptoms are not resolving or worsening during episodes of chest pain. The patient was advised on risk/benefits of the new Rx and she agreed to proceed with the medication(s). 2.) CAD    Continue current cardiac medications. Will add Renexa      3.) Hypercholestermia     Continue current cholesterol lowering medications. Low cholesterol diet  Monitor cholesterol labs routinely    4.) Anxiety  Patient has anxiety at bedtime. States she has been taking xanax 1 mg as needed for anxiety and feels like this helps her chest pain as well. Discussed anxiety lowering activities with patient. Will prescribe one month supply of Xanax with no refills. Patient instructed she will need to follow up with primary care physician regarding anxiety and additional refills. Disposition:   Will have patient follow up in 2-3 months unless having continued or increased pain. Scripts for Renexa, xanax, and routine cardiology medications refilled at this time.        Electronically

## 2021-06-11 ENCOUNTER — HOSPITAL ENCOUNTER (OUTPATIENT)
Dept: MRI IMAGING | Age: 61
Discharge: HOME | End: 2021-06-11
Payer: MEDICARE

## 2021-06-11 DIAGNOSIS — M47.812 CERVICAL SPONDYLOSIS WITHOUT MYELOPATHY: ICD-10-CM

## 2021-06-11 DIAGNOSIS — M48.02 SPINAL STENOSIS IN CERVICAL REGION: ICD-10-CM

## 2021-06-11 DIAGNOSIS — M48.062 PSEUDOCLAUDICATION SYNDROME: ICD-10-CM

## 2021-06-11 DIAGNOSIS — M47.816 LUMBAR SPONDYLOSIS: ICD-10-CM

## 2021-06-11 PROCEDURE — 72141 MRI NECK SPINE W/O DYE: CPT

## 2021-06-11 PROCEDURE — 72148 MRI LUMBAR SPINE W/O DYE: CPT

## 2021-07-07 ENCOUNTER — OFFICE VISIT (OUTPATIENT)
Dept: PHYSICAL MEDICINE AND REHAB | Age: 61
End: 2021-07-07
Payer: MEDICARE

## 2021-07-07 VITALS
HEIGHT: 63 IN | SYSTOLIC BLOOD PRESSURE: 130 MMHG | WEIGHT: 182 LBS | DIASTOLIC BLOOD PRESSURE: 82 MMHG | BODY MASS INDEX: 32.25 KG/M2

## 2021-07-07 DIAGNOSIS — M79.18 MYOFASCIAL PAIN SYNDROME: Primary | ICD-10-CM

## 2021-07-07 PROCEDURE — 20552 NJX 1/MLT TRIGGER POINT 1/2: CPT | Performed by: PAIN MEDICINE

## 2021-07-07 NOTE — PROGRESS NOTES
Procedure  Visit Date: 7/7/21    Kevin Lara is a 63 y.o. female presents today in the office for the following:      History of Present Illness   Perlita Cuello is here today for trigger point injections. Pre-Op Diagnosis   Myofacial pain syndrome     Post-Op Diagnosis   Myofacial pain syndrome     Procedure Documentation   Patient identified. Consent signed. Site identified. A solution of 9cc 0.25% marcaine and 40mg (1cc) DepoMedrol was combined in each syringe. Site was sprayed with Ethyl chloride and then prepped with alcohol swap X 3. Then with a 25g needle entered each trigger point and after negative aspiration, injected 1-2 cc of Marcaine/DepoMedrol solution at each site. A total of 20 cc was used for procedure. Total of 10 sites were injected into 2 muscles. LEFT SPLENIUS CAPITUS AND TRAPEZIUS. Vitals:    07/07/21 1258   BP: 130/82   Weight: 182 lb (82.6 kg)   Height: 5' 3\" (1.6 m)       Conclusion   No complications encountered. Patient discharged stable condition. Patient told if any problems to call office or go to ER. No orders of the defined types were placed in this encounter.       Electronically signed by Gal Gauthier MD on 7/7/21 at 1:28 PM EDT

## 2021-07-12 DIAGNOSIS — M48.061 SPINAL STENOSIS OF LUMBAR REGION, UNSPECIFIED WHETHER NEUROGENIC CLAUDICATION PRESENT: ICD-10-CM

## 2021-07-12 DIAGNOSIS — M75.52 CHRONIC SHOULDER BURSITIS, LEFT: ICD-10-CM

## 2021-07-12 DIAGNOSIS — M54.2 CERVICAL PAIN: ICD-10-CM

## 2021-07-12 DIAGNOSIS — M46.1 SI (SACROILIAC) JOINT INFLAMMATION (HCC): ICD-10-CM

## 2021-07-12 DIAGNOSIS — M25.512 CHRONIC LEFT SHOULDER PAIN: ICD-10-CM

## 2021-07-12 DIAGNOSIS — F11.90 CHRONIC, CONTINUOUS USE OF OPIOIDS: ICD-10-CM

## 2021-07-12 DIAGNOSIS — M48.07 LUMBOSACRAL SPINAL STENOSIS: ICD-10-CM

## 2021-07-12 DIAGNOSIS — M75.42 SHOULDER IMPINGEMENT SYNDROME, LEFT: ICD-10-CM

## 2021-07-12 DIAGNOSIS — M51.36 DDD (DEGENERATIVE DISC DISEASE), LUMBAR: ICD-10-CM

## 2021-07-12 DIAGNOSIS — G89.29 CHRONIC LEFT SHOULDER PAIN: ICD-10-CM

## 2021-07-12 DIAGNOSIS — M47.816 LUMBAR SPONDYLOSIS: ICD-10-CM

## 2021-07-12 DIAGNOSIS — M54.17 LUMBOSACRAL RADICULITIS: ICD-10-CM

## 2021-07-12 DIAGNOSIS — M79.7 FIBROMYALGIA: ICD-10-CM

## 2021-07-12 DIAGNOSIS — M79.18 MYOFASCIAL PAIN SYNDROME: ICD-10-CM

## 2021-07-12 DIAGNOSIS — M19.012 PRIMARY OSTEOARTHRITIS OF LEFT SHOULDER: ICD-10-CM

## 2021-07-12 DIAGNOSIS — G89.4 CHRONIC PAIN SYNDROME: ICD-10-CM

## 2021-07-12 NOTE — TELEPHONE ENCOUNTER
Akosua Ledbetter called requesting a refill on the following medications:  Requested Prescriptions     Pending Prescriptions Disp Refills    HYDROcodone-acetaminophen (NORCO) 5-325 MG per tablet 120 tablet 0     Sig: Take 1 tablet by mouth every 6 hours as needed for Pain for up to 30 days. Pharmacy verified:[facility]  . pv      Date of last visit:   Date of next visit (if applicable): 3/0/1913

## 2021-07-13 RX ORDER — HYDROCODONE BITARTRATE AND ACETAMINOPHEN 5; 325 MG/1; MG/1
1 TABLET ORAL EVERY 6 HOURS PRN
Qty: 120 TABLET | Refills: 0 | Status: SHIPPED | OUTPATIENT
Start: 2021-07-13 | End: 2021-08-12

## 2021-07-13 NOTE — TELEPHONE ENCOUNTER
OARRS reviewed. UDS: + for Alpha-Hydroxyalprazolam, Methamphetamine, Hydrocodone, Norhydrocodone. Last seen: 5/26/2021.  Follow-up:   Future Appointments   Date Time Provider Rasheeda Stewart   9/1/2021  4:20 PM LEIGHA Shepherd - CNP [facility]   6/2/2022  9:15 AM Denver Hakim, MD [facility]     *patient states they are using a Vicks Vapoinhaler

## 2021-09-01 ENCOUNTER — OFFICE VISIT (OUTPATIENT)
Dept: PHYSICAL MEDICINE AND REHAB | Age: 61
End: 2021-09-01
Payer: MEDICARE

## 2021-09-01 VITALS
HEART RATE: 70 BPM | DIASTOLIC BLOOD PRESSURE: 72 MMHG | SYSTOLIC BLOOD PRESSURE: 118 MMHG | WEIGHT: 180 LBS | HEIGHT: 63 IN | BODY MASS INDEX: 31.89 KG/M2

## 2021-09-01 DIAGNOSIS — M19.012 PRIMARY OSTEOARTHRITIS OF LEFT SHOULDER: ICD-10-CM

## 2021-09-01 DIAGNOSIS — M75.42 SHOULDER IMPINGEMENT SYNDROME, LEFT: ICD-10-CM

## 2021-09-01 DIAGNOSIS — M79.18 MYOFASCIAL PAIN SYNDROME: ICD-10-CM

## 2021-09-01 DIAGNOSIS — G89.29 CHRONIC LEFT SHOULDER PAIN: ICD-10-CM

## 2021-09-01 DIAGNOSIS — M79.7 FIBROMYALGIA: ICD-10-CM

## 2021-09-01 DIAGNOSIS — M48.061 SPINAL STENOSIS OF LUMBAR REGION, UNSPECIFIED WHETHER NEUROGENIC CLAUDICATION PRESENT: ICD-10-CM

## 2021-09-01 DIAGNOSIS — M46.1 SI (SACROILIAC) JOINT INFLAMMATION (HCC): ICD-10-CM

## 2021-09-01 DIAGNOSIS — M25.512 CHRONIC LEFT SHOULDER PAIN: ICD-10-CM

## 2021-09-01 DIAGNOSIS — M51.36 DDD (DEGENERATIVE DISC DISEASE), LUMBAR: ICD-10-CM

## 2021-09-01 DIAGNOSIS — M47.816 LUMBAR SPONDYLOSIS: Primary | ICD-10-CM

## 2021-09-01 DIAGNOSIS — G89.4 CHRONIC PAIN SYNDROME: ICD-10-CM

## 2021-09-01 DIAGNOSIS — M75.52 CHRONIC SHOULDER BURSITIS, LEFT: ICD-10-CM

## 2021-09-01 PROCEDURE — 99214 OFFICE O/P EST MOD 30 MIN: CPT | Performed by: NURSE PRACTITIONER

## 2021-09-01 RX ORDER — HYDROCODONE BITARTRATE AND ACETAMINOPHEN 5; 325 MG/1; MG/1
1 TABLET ORAL EVERY 6 HOURS PRN
Qty: 120 TABLET | Refills: 0 | Status: SHIPPED | OUTPATIENT
Start: 2021-09-01 | End: 2021-10-18 | Stop reason: SDUPTHER

## 2021-09-01 ASSESSMENT — ENCOUNTER SYMPTOMS
RHINORRHEA: 0
SINUS PRESSURE: 0
COLOR CHANGE: 0
SHORTNESS OF BREATH: 0
DIARRHEA: 0
SORE THROAT: 0
CHEST TIGHTNESS: 0
WHEEZING: 0
EYE PAIN: 0
NAUSEA: 0
PHOTOPHOBIA: 0
BACK PAIN: 1
ABDOMINAL PAIN: 0
COUGH: 0
VOMITING: 0
CONSTIPATION: 0

## 2021-09-01 NOTE — PROGRESS NOTES
[facility] [address]  Dept: [phone]  Dept Fax: [phone]: [phone]    Visit Date: 9/1/2021    Functionality Assessment/Goals Worksheet     On a scale of 0 (Does not Interfere) to 10 (Completely Interferes)     1. Which number describes how during the past week pain has interfered with       the following:  A. General Activity:  9  B. Mood: 8  C. Walking Ability:  7  D. Normal Work (Includes both work outside the home and housework):  9  E. Relations with Other People:   5  F. Sleep:   10  G. Enjoyment of Life:   4    2. Patient Prefers to Take their Pain Medications:     []  On a regular basis   [x]  Only when necessary    []  Does not take pain medications    3. What are the Patient's Goals/Expectations for Visiting Pain Management? []  Learn about my pain    [x]  Receive Medication   []  Physical Therapy     []  Treat Depression   [x]  Receive Injections    []  Treat Sleep   []  Deal with Anxiety and Stress   []  Treat Opoid Dependence/Addiction   []  Other:      HPI:   Kat Mosley is a 63 y.o. female is here today for    Chief Complaint: Low back pain, Mid back pain and Neck pain BLE left shoulder SI pain. HPI   F/U TPI  Cervical thoracic  7/7/2021 states she received about  65% pain relief or improvement  for 6 weeks  and now continues about 40-50% relief. She left shoulder steroid injection 3/24/2021  95% pain relief or benefit so far. She seen Dr Mason Chacon for lumbar pain consult. He ordered MRI pending. Recent heart cath and EGD due to GERD and ulcers. She had CABG  11/11/2020 and is finished with Cardiac Rehab now and continues HEP. She has had steroid injection to shoulder in past and TPI injections with 40% pain relief for 4-6 weeks but then had CABG surgery.   She continues to take [address]   She had Lumbar Cervical MRI and had consult with Dr Millie Acosta no stenosis no surgery recommended. She was referred to Dr Dee Dee Gupta rheumatology now [facility]     Pain increases with bending, lifting, twisting , reaching, pushing, pulling, walking, standing, stairs and getting up and down. Treatments Tried PT, ice, heat, Chiropractor, NSAIDS, narcotics, muscle relaxer, OTC rubs creams patches, massage or TPI, steroid burst and injections  Pain Description sharp, shooting, stabbing, burning, pressure, throbbing, aching, numbness and tingling    Medications reviewed. Patient denies side effects with medications. Patient states she is taking medications as prescribed. Shedenies receiving pain medications from other sources. She denies any ER visits since last visit. Pain scale with out pain medications or at its worst is 9/10. Pain scale with pain medications or at its best is 3/10. Last dose of [place] was today  Drug screen reviewed from 5/2021 and was appropriate  Pill count was completed today and was appropriate Pt is out of meds:   Patient does not have naloxone available at home. Patient has not required use of naloxone at home since last office visit. Cervical and Lumbar MRI  6/2021 per Dr Silvio Childress:   There is 3 mm of retrolisthesis of L1 on L2. There is loss of disc height at this level.       The other lumbar vertebral bodies are normally aligned. Jesus Comer is normal marrow signal throughout. Jesus Comer is no bone marrow edema.  There are no compression fractures.  No pars defects are noted.  There is disc desiccation throughout.       The distal spinal cord, conus medullaris and cauda equina are normal.        There are no gross abnormalities in the visualized aspects of the distal thoracic spine.       On the axial images, at T12-L1, there are no degenerative changes. There is no spinal canal or foraminal stenosis.       At L1-L2, there is mild loss of disc height. There is a diffuse disc bulge.  There is minimal spinal canal stenosis. There is no foraminal stenosis.       At L2-L3, there is a diffuse disc bulge. There are very mild facet degenerative changes. There is minimal spinal canal stenosis. There is no foraminal stenosis.       At L3-L4, there are mild facet degenerative changes. There is a diffuse disc bulge. There is mild spinal canal stenosis. There is no foraminal stenosis.       At L4-L5, there are very mild facet degenerative changes. There is no focal disc abnormality. There is no spinal canal stenosis. There is no foraminal stenosis.       At L5-S1, there are mild facet degenerative changes. There is no spinal canal or foraminal stenosis.       There are no suspicious findings in the visualized aspects of the retroperitoneum and paraspinal soft tissues.               Impression    Mild degenerative changes in the lumbar spine without significant spinal canal or foraminal stenosis at any level.         FINDINGS:               The cervical vertebral bodies are normally aligned. There is normal marrow signal throughout. No suspicious osseous lesions are present.  The facet joints are normally aligned.       The cervical spinal cord is of normal caliber and signal intensity.  The visualized aspects of the posterior fossa are normal.       On the axial images, at C2-C3, there are no degenerative changes. There is no spinal canal or foraminal stenosis.       At C3-C4, there are very mild left-sided facet degenerative changes. There is no spinal canal or foraminal stenosis.       At C4-C5, there are left-sided facet degenerative changes. There is no spinal canal stenosis. There is mild left foraminal stenosis.       At C5-C6, there is no spinal canal or foraminal stenosis.       At C6-C7, there is no spinal canal or foraminal stenosis.       At C7-T1, there is no spinal canal or foraminal stenosis.       There are no suspicious findings in the cervical soft tissues.               Impression           1.  Normal cervical spinal cord.   2. Mild left-sided facet degenerative changes. There is mild left foraminal stenosis at the C4-5 level. 3. No evidence of spinal canal stenosis at any level.             The patientis allergic to other. Subjective:      Review of Systems   Constitutional: Positive for activity change. Negative for appetite change, chills, diaphoresis, fatigue, fever and unexpected weight change. HENT: Negative for congestion, ear pain, hearing loss, mouth sores, nosebleeds, rhinorrhea, sinus pressure and sore throat. Eyes: Negative for photophobia, pain and visual disturbance. Respiratory: Negative for cough, chest tightness, shortness of breath and wheezing. Cardiovascular: Negative for chest pain and palpitations. HTN  CABG 4 stents  October 2015  Had recent CABG  11/112020 at [facility] 34 has right chest breast burning neuropathy. Recent heart cath April 2021 due to GERD rule pout heart issues   Gastrointestinal: Negative for abdominal pain, constipation, diarrhea, nausea and vomiting. Diverticulitis recent EGD ulcers  GERD   Endocrine: Negative for cold intolerance, heat intolerance, polydipsia, polyphagia and polyuria. Genitourinary: Negative for decreased urine volume, difficulty urinating, frequency and hematuria. Musculoskeletal: Positive for arthralgias, back pain, gait problem, myalgias, neck pain and neck stiffness. Negative for joint swelling. Skin: Negative for color change and rash. Allergic/Immunologic: Negative for food allergies and immunocompromised state. Neurological: Negative for dizziness, tremors, seizures, syncope, facial asymmetry, speech difficulty, weakness, light-headedness, numbness and headaches. Hematological: Does not bruise/bleed easily. Psychiatric/Behavioral: Negative for agitation, behavioral problems, confusion, decreased concentration, dysphoric mood, hallucinations, self-injury, sleep disturbance and suicidal ideas.  The patient is nervous/anxious. The patient is not hyperactive. Anxiety  depression        Objective:     Vitals:    09/01/21 1603   BP: 118/72   Site: Left Upper Arm   Position: Sitting   Cuff Size: Medium Adult   Pulse: 70   Weight: 180 lb (81.6 kg)   Height: 5' 3\" (1.6 m)       Physical Exam  Vitals and nursing note reviewed. Constitutional:       General: She is not in acute distress. Appearance: She is well-developed. She is not diaphoretic. HENT:      Head: Normocephalic and atraumatic. Right Ear: External ear normal.      Left Ear: External ear normal.      Nose: Nose normal.      Mouth/Throat:      Pharynx: No oropharyngeal exudate. Eyes:      General: No scleral icterus. Right eye: No discharge. Left eye: No discharge. Conjunctiva/sclera: Conjunctivae normal.      Pupils: Pupils are equal, round, and reactive to light. Neck:      Thyroid: No thyromegaly. Cardiovascular:      Rate and Rhythm: Normal rate and regular rhythm. Heart sounds: Normal heart sounds. No murmur heard. No friction rub. No gallop. Comments: [facility]  [facility]/11/[facility]  Right chest breast  Numbness   Pulmonary:      Effort: Pulmonary effort is normal. No respiratory distress. Breath sounds: Normal breath sounds. No wheezing or rales. Chest:      Chest wall: No tenderness. Abdominal:      General: Bowel sounds are normal. There is no distension. Palpations: Abdomen is soft. Tenderness: There is no abdominal tenderness. There is no guarding or rebound. Musculoskeletal:         General: Tenderness present. Right shoulder: Tenderness present. Left shoulder: Tenderness and bony tenderness present. Decreased range of motion. Decreased strength. Left elbow: Tenderness present. Right wrist: Tenderness present. Left wrist: Tenderness present. Right hand: Tenderness present. Decreased range of motion. Decreased strength.       Left hand: Tenderness and bony tenderness present. Decreased range of motion. Decreased strength. Decreased sensation. Cervical back: Neck supple. Spasms, tenderness and bony tenderness present. No edema, erythema or rigidity. Muscular tenderness present. Decreased range of motion. Thoracic back: Spasms, tenderness and bony tenderness present. Lumbar back: Spasms, tenderness and bony tenderness present. Decreased range of motion. Back:       Right hip: Tenderness and bony tenderness present. Left hip: Tenderness and bony tenderness present. Right upper leg: Tenderness present. Left upper leg: Tenderness present. Right knee: Bony tenderness present. Tenderness present. Left knee: Bony tenderness present. Tenderness present. Right ankle: Tenderness present. Left ankle: Tenderness present. Right foot: Tenderness present. Left foot: Tenderness present. Comments: Trigger finger injections in past   Skin:     General: Skin is warm. Coloration: Skin is not pale. Findings: No erythema or rash. Neurological:      Mental Status: She is alert and oriented to person, place, and time. She is not disoriented. Cranial Nerves: Cranial nerves are intact. No cranial nerve deficit. Sensory: Sensation is intact. No sensory deficit. Motor: Motor function is intact. No atrophy or abnormal muscle tone. Coordination: Coordination is intact. Coordination normal.      Gait: Gait abnormal.      Deep Tendon Reflexes: Reflexes are normal and symmetric. Babinski sign absent on the right side. Reflex Scores:       Tricep reflexes are 2+ on the right side and 2+ on the left side. Bicep reflexes are 2+ on the right side and 2+ on the left side. Brachioradialis reflexes are 2+ on the right side and 2+ on the left side. Patellar reflexes are 2+ on the right side and 2+ on the left side.        Achilles reflexes are 2+ on the right side and 2+ on the left side. Comments: Motor 4/5 LLE 4/5 BUE  SLR-   Psychiatric:         Attention and Perception: Attention and perception normal. She is attentive. Mood and Affect: Mood and affect normal. Mood is not anxious or depressed. Affect is not labile, blunt, angry or inappropriate. Speech: Speech normal. She is communicative. Speech is not rapid and pressured, delayed, slurred or tangential.         Behavior: Behavior normal. Behavior is not agitated, slowed, aggressive, withdrawn, hyperactive or combative. Behavior is cooperative. Thought Content: Thought content normal. Thought content is not paranoid or delusional. Thought content does not include homicidal or suicidal ideation. Thought content does not include homicidal or suicidal plan. Cognition and Memory: Cognition and memory normal. Memory is not impaired. She does not exhibit impaired recent memory or impaired remote memory. Judgment: Judgment normal. Judgment is not impulsive or inappropriate. Comments:  anxiety depression        HARIKA test: +  Yeomans test:+  Gaenslen test:+     Assessment:     1. Lumbar spondylosis    2. Primary osteoarthritis of left shoulder    3. Chronic left shoulder pain    4. Fibromyalgia    5. Myofascial pain syndrome    6. Spinal stenosis of lumbar region, unspecified whether neurogenic claudication present    7. DDD (degenerative disc disease), lumbar    8. SI (sacroiliac) joint inflammation (HCC)    9. Chronic pain syndrome    10. Chronic shoulder bursitis, left    11. Shoulder impingement syndrome, left            Plan:      · OARRS reviewed. Current MED: 22  · Patient was not offered naloxone for home. · Discussed long term side effects of medications, tolerance, dependency and addiction. · Previous UDS reviewed  · UDS preformed today for compliance. · Patient told can not receive any pain medications from any other source.   · No evidence of abuse, diversion or aberrant behavior.  Medications and/or procedures to improve function and quality of life- patient understanding with this and that may not be pain free   Discussed with patient about safe storage of medications at home   Discussed possible weaning of medication dosing dependent on treatment/procedure results.  Testing: Cervical Lumbar MRI reviewed 6./2021   Procedures: TPI    Discussed with patient about risks with procedure including infection, reaction to medication, increased pain, or bleeding.  Medications:Norco Flexeril Lidoderm patch    If patient is on blood thinners will need approval to hold: ASP Plavix       Meds. Prescribed:   Orders Placed This Encounter   Medications    HYDROcodone-acetaminophen (NORCO) 5-325 MG per tablet     Sig: Take 1 tablet by mouth every 6 hours as needed for Pain for up to 30 days. Dispense:  120 tablet     Refill:  0     Reduce doses taken as pain becomes manageable       Return in about 3 months (around 12/1/2021) for TPI with Dr Haylee Marino , Follow up after procedure.                Electronically signed by LEIGHA Carvalho CNP on9/1/2021 at 4:49 PM

## 2021-10-04 RX ORDER — FUROSEMIDE 20 MG/1
20 TABLET ORAL DAILY
Qty: 90 TABLET | Refills: 3 | Status: SHIPPED | OUTPATIENT
Start: 2021-10-04 | End: 2022-10-02

## 2021-10-08 RX ORDER — ATORVASTATIN CALCIUM 80 MG/1
TABLET, FILM COATED ORAL
Qty: 90 TABLET | Refills: 2 | Status: SHIPPED | OUTPATIENT
Start: 2021-10-08 | End: 2022-07-05

## 2021-10-12 ENCOUNTER — HOSPITAL ENCOUNTER (OUTPATIENT)
Age: 61
Discharge: HOME | End: 2021-10-12
Payer: MEDICARE

## 2021-10-12 LAB
ALBUMIN SERPL-MCNC: 4.5 G/DL (ref 3.5–5.1)
ALP BLD-CCNC: 94 U/L (ref 38–126)
ALT SERPL-CCNC: 23 U/L (ref 11–66)
ANION GAP SERPL CALCULATED.3IONS-SCNC: 12 MEQ/L (ref 8–16)
AST SERPL-CCNC: 19 U/L (ref 5–40)
BASOPHILS # BLD: 0.7 %
BASOPHILS ABSOLUTE: 0 THOU/MM3 (ref 0–0.1)
BILIRUB SERPL-MCNC: 0.5 MG/DL (ref 0.3–1.2)
BUN BLDV-MCNC: 16 MG/DL (ref 7–22)
C-REACTIVE PROTEIN: < 0.3 MG/DL (ref 0–1)
CALCIUM SERPL-MCNC: 10.1 MG/DL (ref 8.5–10.5)
CHLORIDE BLD-SCNC: 104 MEQ/L (ref 98–111)
CO2: 26 MEQ/L (ref 23–33)
CREAT SERPL-MCNC: 0.9 MG/DL (ref 0.4–1.2)
EOSINOPHIL # BLD: 3.1 %
EOSINOPHILS ABSOLUTE: 0.2 THOU/MM3 (ref 0–0.4)
ERYTHROCYTE [DISTWIDTH] IN BLOOD BY AUTOMATED COUNT: 12.3 % (ref 11.5–14.5)
ERYTHROCYTE [DISTWIDTH] IN BLOOD BY AUTOMATED COUNT: 44.4 FL (ref 35–45)
GFR SERPL CREATININE-BSD FRML MDRD: 63 ML/MIN/1.73M2
GLUCOSE BLD-MCNC: 115 MG/DL (ref 70–108)
HCT VFR BLD CALC: 43.7 % (ref 37–47)
HEMOGLOBIN: 14.1 GM/DL (ref 12–16)
IMMATURE GRANS (ABS): 0.02 THOU/MM3 (ref 0–0.07)
IMMATURE GRANULOCYTES: 0.3 %
LYMPHOCYTES # BLD: 34.2 %
LYMPHOCYTES ABSOLUTE: 2 THOU/MM3 (ref 1–4.8)
MCH RBC QN AUTO: 31.6 PG (ref 26–33)
MCHC RBC AUTO-ENTMCNC: 32.3 GM/DL (ref 32.2–35.5)
MCV RBC AUTO: 98 FL (ref 81–99)
MONOCYTES # BLD: 8.3 %
MONOCYTES ABSOLUTE: 0.5 THOU/MM3 (ref 0.4–1.3)
NUCLEATED RED BLOOD CELLS: 0 /100 WBC
PLATELET # BLD: 298 THOU/MM3 (ref 130–400)
PMV BLD AUTO: 9.8 FL (ref 9.4–12.4)
POTASSIUM SERPL-SCNC: 4.3 MEQ/L (ref 3.5–5.2)
RBC # BLD: 4.46 MILL/MM3 (ref 4.2–5.4)
RHEUMATOID FACTOR: < 10 IU/ML (ref 0–13)
SEDIMENTATION RATE, ERYTHROCYTE: 2 MM/HR (ref 0–20)
SEG NEUTROPHILS: 53.4 %
SEGMENTED NEUTROPHILS ABSOLUTE COUNT: 3.2 THOU/MM3 (ref 1.8–7.7)
SODIUM BLD-SCNC: 142 MEQ/L (ref 135–145)
TOTAL PROTEIN: 6.9 G/DL (ref 6.1–8)
URIC ACID: 4.9 MG/DL (ref 2.4–5.7)
VITAMIN D 25-HYDROXY: 48 NG/ML (ref 30–100)
WBC # BLD: 5.9 THOU/MM3 (ref 4.8–10.8)

## 2021-10-12 PROCEDURE — 86038 ANTINUCLEAR ANTIBODIES: CPT

## 2021-10-12 PROCEDURE — 36415 COLL VENOUS BLD VENIPUNCTURE: CPT

## 2021-10-12 PROCEDURE — 85651 RBC SED RATE NONAUTOMATED: CPT

## 2021-10-12 PROCEDURE — 85025 COMPLETE CBC W/AUTO DIFF WBC: CPT

## 2021-10-12 PROCEDURE — 80053 COMPREHEN METABOLIC PANEL: CPT

## 2021-10-12 PROCEDURE — 86430 RHEUMATOID FACTOR TEST QUAL: CPT

## 2021-10-12 PROCEDURE — 86140 C-REACTIVE PROTEIN: CPT

## 2021-10-12 PROCEDURE — 86200 CCP ANTIBODY: CPT

## 2021-10-12 PROCEDURE — 84550 ASSAY OF BLOOD/URIC ACID: CPT

## 2021-10-12 PROCEDURE — 82306 VITAMIN D 25 HYDROXY: CPT

## 2021-10-14 LAB — ANA SCREEN: NORMAL

## 2021-10-15 LAB — CYCLIC CITRULLINATED PEPTIDE ANTIBODY IGG: 1.7 U/ML (ref 0–7)

## 2021-10-18 DIAGNOSIS — M51.36 DDD (DEGENERATIVE DISC DISEASE), LUMBAR: ICD-10-CM

## 2021-10-18 DIAGNOSIS — M75.42 SHOULDER IMPINGEMENT SYNDROME, LEFT: ICD-10-CM

## 2021-10-18 DIAGNOSIS — M46.1 SI (SACROILIAC) JOINT INFLAMMATION (HCC): ICD-10-CM

## 2021-10-18 DIAGNOSIS — M19.012 PRIMARY OSTEOARTHRITIS OF LEFT SHOULDER: ICD-10-CM

## 2021-10-18 DIAGNOSIS — M48.061 SPINAL STENOSIS OF LUMBAR REGION, UNSPECIFIED WHETHER NEUROGENIC CLAUDICATION PRESENT: ICD-10-CM

## 2021-10-18 DIAGNOSIS — M75.52 CHRONIC SHOULDER BURSITIS, LEFT: ICD-10-CM

## 2021-10-18 DIAGNOSIS — G89.4 CHRONIC PAIN SYNDROME: ICD-10-CM

## 2021-10-18 DIAGNOSIS — M79.18 MYOFASCIAL PAIN SYNDROME: ICD-10-CM

## 2021-10-18 DIAGNOSIS — M47.816 LUMBAR SPONDYLOSIS: ICD-10-CM

## 2021-10-18 DIAGNOSIS — M79.7 FIBROMYALGIA: ICD-10-CM

## 2021-10-18 DIAGNOSIS — M25.512 CHRONIC LEFT SHOULDER PAIN: ICD-10-CM

## 2021-10-18 DIAGNOSIS — G89.29 CHRONIC LEFT SHOULDER PAIN: ICD-10-CM

## 2021-10-18 NOTE — TELEPHONE ENCOUNTER
Tyler Montemayor called requesting a refill on the following medications:  Requested Prescriptions     Pending Prescriptions Disp Refills    HYDROcodone-acetaminophen (NORCO) 5-325 MG per tablet 120 tablet 0     Sig: Take 1 tablet by mouth every 6 hours as needed for Pain for up to 30 days. Pharmacy verified: [facility]  . pv      Date of last visit: 9/01/2021   Date of next visit (if applicable): 15/5/5588

## 2021-10-19 RX ORDER — HYDROCODONE BITARTRATE AND ACETAMINOPHEN 5; 325 MG/1; MG/1
1 TABLET ORAL EVERY 6 HOURS PRN
Qty: 120 TABLET | Refills: 0 | Status: SHIPPED | OUTPATIENT
Start: 2021-10-19 | End: 2021-11-18

## 2021-11-23 ENCOUNTER — HOSPITAL ENCOUNTER (OUTPATIENT)
Dept: NUCLEAR MEDICINE | Age: 61
Discharge: HOME | End: 2021-11-23
Admitting: NURSE PRACTITIONER
Payer: MEDICARE

## 2021-11-23 VITALS — WEIGHT: 182 LBS | HEIGHT: 63 IN | BODY MASS INDEX: 32.25 KG/M2

## 2021-11-23 DIAGNOSIS — K76.89 HEPATIC CYST: ICD-10-CM

## 2021-11-23 DIAGNOSIS — R10.9 ABDOMINAL PAIN, UNSPECIFIED ABDOMINAL LOCATION: ICD-10-CM

## 2021-11-23 PROCEDURE — 2580000003 HC RX 258

## 2021-11-23 PROCEDURE — 3430000000 HC RX DIAGNOSTIC RADIOPHARMACEUTICAL

## 2021-11-23 PROCEDURE — 6360000002 HC RX W HCPCS

## 2021-11-23 PROCEDURE — 78227 HEPATOBIL SYST IMAGE W/DRUG: CPT

## 2021-11-23 PROCEDURE — A9537 TC99M MEBROFENIN: HCPCS

## 2021-11-23 RX ADMIN — Medication 8.1 MILLICURIE: at 09:30

## 2021-11-23 RX ADMIN — SODIUM CHLORIDE 1.65 MCG: 9 INJECTION, SOLUTION INTRAVENOUS at 10:36

## 2021-12-06 ENCOUNTER — TELEPHONE (OUTPATIENT)
Dept: PHYSICAL MEDICINE AND REHAB | Age: 61
End: 2021-12-06

## 2021-12-06 ENCOUNTER — OFFICE VISIT (OUTPATIENT)
Dept: PHYSICAL MEDICINE AND REHAB | Age: 61
End: 2021-12-06
Payer: MEDICARE

## 2021-12-06 VITALS
WEIGHT: 185 LBS | DIASTOLIC BLOOD PRESSURE: 62 MMHG | BODY MASS INDEX: 32.78 KG/M2 | HEART RATE: 64 BPM | SYSTOLIC BLOOD PRESSURE: 118 MMHG | HEIGHT: 63 IN

## 2021-12-06 DIAGNOSIS — M19.012 PRIMARY OSTEOARTHRITIS OF LEFT SHOULDER: ICD-10-CM

## 2021-12-06 DIAGNOSIS — M46.1 SI (SACROILIAC) JOINT INFLAMMATION (HCC): ICD-10-CM

## 2021-12-06 DIAGNOSIS — M75.52 CHRONIC SHOULDER BURSITIS, LEFT: ICD-10-CM

## 2021-12-06 DIAGNOSIS — G89.29 CHRONIC LEFT SHOULDER PAIN: ICD-10-CM

## 2021-12-06 DIAGNOSIS — M79.7 FIBROMYALGIA: ICD-10-CM

## 2021-12-06 DIAGNOSIS — M48.061 SPINAL STENOSIS OF LUMBAR REGION, UNSPECIFIED WHETHER NEUROGENIC CLAUDICATION PRESENT: ICD-10-CM

## 2021-12-06 DIAGNOSIS — M75.42 SHOULDER IMPINGEMENT SYNDROME, LEFT: ICD-10-CM

## 2021-12-06 DIAGNOSIS — G89.4 CHRONIC PAIN SYNDROME: ICD-10-CM

## 2021-12-06 DIAGNOSIS — M79.18 MYOFASCIAL PAIN SYNDROME: ICD-10-CM

## 2021-12-06 DIAGNOSIS — M25.512 CHRONIC LEFT SHOULDER PAIN: ICD-10-CM

## 2021-12-06 DIAGNOSIS — M51.36 DDD (DEGENERATIVE DISC DISEASE), LUMBAR: ICD-10-CM

## 2021-12-06 DIAGNOSIS — M47.816 LUMBAR SPONDYLOSIS: Primary | ICD-10-CM

## 2021-12-06 PROCEDURE — 99214 OFFICE O/P EST MOD 30 MIN: CPT | Performed by: NURSE PRACTITIONER

## 2021-12-06 RX ORDER — METAXALONE 800 MG/1
800 TABLET ORAL 3 TIMES DAILY
COMMUNITY
End: 2022-05-03

## 2021-12-06 RX ORDER — HYDROCODONE BITARTRATE AND ACETAMINOPHEN 5; 325 MG/1; MG/1
1 TABLET ORAL EVERY 6 HOURS PRN
Qty: 120 TABLET | Refills: 0 | Status: SHIPPED | OUTPATIENT
Start: 2021-12-06 | End: 2022-02-15 | Stop reason: SDUPTHER

## 2021-12-06 ASSESSMENT — ENCOUNTER SYMPTOMS
NAUSEA: 0
ABDOMINAL PAIN: 0
CHEST TIGHTNESS: 0
COUGH: 0
RHINORRHEA: 0
SINUS PRESSURE: 0
EYE PAIN: 0
BACK PAIN: 1
DIARRHEA: 0
WHEEZING: 0
VOMITING: 0
COLOR CHANGE: 0
SHORTNESS OF BREATH: 0
CONSTIPATION: 0
SORE THROAT: 0
PHOTOPHOBIA: 0

## 2021-12-13 ENCOUNTER — TELEPHONE (OUTPATIENT)
Dept: PHYSICAL MEDICINE AND REHAB | Age: 61
End: 2021-12-13

## 2021-12-13 NOTE — TELEPHONE ENCOUNTER
Called pt to schedule procedure. She is going out of town over [place] will call when she is ready to schedule.     Medical clearance to hold Plavix and ASA 5 days prior scan under medica

## 2022-02-15 ENCOUNTER — OFFICE VISIT (OUTPATIENT)
Dept: PHYSICAL MEDICINE AND REHAB | Age: 62
End: 2022-02-15
Payer: MEDICARE

## 2022-02-15 VITALS
HEART RATE: 62 BPM | SYSTOLIC BLOOD PRESSURE: 128 MMHG | DIASTOLIC BLOOD PRESSURE: 70 MMHG | BODY MASS INDEX: 32.96 KG/M2 | WEIGHT: 186 LBS | HEIGHT: 63 IN

## 2022-02-15 DIAGNOSIS — M51.36 DDD (DEGENERATIVE DISC DISEASE), LUMBAR: ICD-10-CM

## 2022-02-15 DIAGNOSIS — M47.816 LUMBAR SPONDYLOSIS: Primary | ICD-10-CM

## 2022-02-15 DIAGNOSIS — M79.18 MYOFASCIAL PAIN SYNDROME: ICD-10-CM

## 2022-02-15 DIAGNOSIS — G89.4 CHRONIC PAIN SYNDROME: ICD-10-CM

## 2022-02-15 DIAGNOSIS — M79.7 FIBROMYALGIA: ICD-10-CM

## 2022-02-15 DIAGNOSIS — M25.512 CHRONIC LEFT SHOULDER PAIN: ICD-10-CM

## 2022-02-15 DIAGNOSIS — M19.012 PRIMARY OSTEOARTHRITIS OF LEFT SHOULDER: ICD-10-CM

## 2022-02-15 DIAGNOSIS — M75.52 CHRONIC SHOULDER BURSITIS, LEFT: ICD-10-CM

## 2022-02-15 DIAGNOSIS — G89.29 CHRONIC LEFT SHOULDER PAIN: ICD-10-CM

## 2022-02-15 DIAGNOSIS — M48.061 SPINAL STENOSIS OF LUMBAR REGION, UNSPECIFIED WHETHER NEUROGENIC CLAUDICATION PRESENT: ICD-10-CM

## 2022-02-15 DIAGNOSIS — M75.42 SHOULDER IMPINGEMENT SYNDROME, LEFT: ICD-10-CM

## 2022-02-15 DIAGNOSIS — M46.1 SI (SACROILIAC) JOINT INFLAMMATION (HCC): ICD-10-CM

## 2022-02-15 PROCEDURE — 99214 OFFICE O/P EST MOD 30 MIN: CPT | Performed by: NURSE PRACTITIONER

## 2022-02-15 RX ORDER — HYDROCODONE BITARTRATE AND ACETAMINOPHEN 5; 325 MG/1; MG/1
1 TABLET ORAL EVERY 6 HOURS PRN
Qty: 120 TABLET | Refills: 0 | Status: SHIPPED | OUTPATIENT
Start: 2022-02-15 | End: 2022-05-17 | Stop reason: SDUPTHER

## 2022-02-15 ASSESSMENT — ENCOUNTER SYMPTOMS
WHEEZING: 0
SHORTNESS OF BREATH: 0
EYE PAIN: 0
ABDOMINAL PAIN: 0
DIARRHEA: 0
CHEST TIGHTNESS: 0
SORE THROAT: 0
COLOR CHANGE: 0
PHOTOPHOBIA: 0
SINUS PRESSURE: 0
CONSTIPATION: 0
COUGH: 0
BACK PAIN: 1
VOMITING: 0
RHINORRHEA: 0
NAUSEA: 0

## 2022-02-15 NOTE — PROGRESS NOTES
[facility] [address]  Dept: [phone]  Dept Fax: [phone]: [phone]    Visit Date: 2/15/2022    Functionality Assessment/Goals Worksheet     On a scale of 0 (Does not Interfere) to 10 (Completely Interferes)     1. Which number describes how during the past week pain has interfered with       the following:  A. General Activity:  5  B. Mood: 2  C. Walking Ability:  4  D. Normal Work (Includes both work outside the home and housework):  7  E. Relations with Other People:   2  F. Sleep:   9  G. Enjoyment of Life:   3    2. Patient Prefers to Take their Pain Medications:     []  On a regular basis   [x]  Only when necessary    []  Does not take pain medications    3. What are the Patient's Goals/Expectations for Visiting Pain Management? []  Learn about my pain    [x]  Receive Medication   []  Physical Therapy     []  Treat Depression   [x]  Receive Injections    []  Treat Sleep   []  Deal with Anxiety and Stress   []  Treat Opoid Dependence/Addiction   []  Other:      HPI:   Kala Arreola is a 57 y.o. female is here today for    Chief Complaint: Low back pain, Mid back pain, Hip pain and SI pain shoulder     HPI   F/U never had Lumbar Facet MBB #1  L4-5,5-S1 bilateral due to traveling to [place]. She continues to have low back bilateral SI hip thigh pain. Dr Marc Pena did not recommend and lumbar surgery. TPI  Cervical thoracic  7/7/2021 states she received about  65% pain relief or improvement  for 6 weeks. She had  left shoulder steroid injection 3/24/2021 75% continued relief. She had CABG  11/11/2020 and is finished with Cardiac Rehab now and continues HEP. She has had steroid injection to shoulder in past and TPI injections with 40% pain relief for 4-6 weeks but then had CABG surgery.   She continues to take [address]   She had Lumbar Cervical MRI and had consult with Dr Rajiv Landeros June no stenosis no surgery recommended. She was referred to Dr Brigitte Haskins rheumatology now [place] [facility]     Pain increases with bending, lifting, twisting , reaching, pushing, pulling, walking, standing, stairs and getting up and down. Treatments Tried PT, ice, heat, Chiropractor, NSAIDS, narcotics, muscle relaxer, OTC rubs creams patches, massage or TPI, steroid burst and injections  Pain Description sharp, shooting, stabbing, burning, pressure, throbbing, aching, numbness and tingling  Medications reviewed. Patient denies side effects with medications. Patient states she is taking medications as prescribed. Shedenies receiving pain medications from other sources. She denies any ER visits since last visit. Pain scale with out pain medications or at its worst is 9/10. Pain scale with pain medications or at its best is 3/10. Last dose of Hope  was today   Drug screen reviewed from12/2021 and was appropriate  Pill count completed  today and WNL: Yes      The patientis allergic to other. Subjective:      Review of Systems   Constitutional: Positive for activity change. Negative for appetite change, chills, diaphoresis, fatigue, fever and unexpected weight change. HENT: Negative for congestion, ear pain, hearing loss, mouth sores, nosebleeds, rhinorrhea, sinus pressure and sore throat. Eyes: Negative for photophobia, pain and visual disturbance. Respiratory: Negative for cough, chest tightness, shortness of breath and wheezing. Cardiovascular: Negative for chest pain and palpitations. HTN  CABG 4 stents  October 2015  Had recent CABG  11/112020 at [facility] 34 has right chest breast burning neuropathy. Recent heart cath April 2021 due to GERD rule pout heart issues   Gastrointestinal: Negative for abdominal pain, constipation, diarrhea, nausea and vomiting.         Diverticulitis recent EGD ulcers  GERD   Endocrine: Negative for cold intolerance, heat intolerance, polydipsia, polyphagia and polyuria. Genitourinary: Negative for decreased urine volume, difficulty urinating, frequency and hematuria. Musculoskeletal: Positive for arthralgias, back pain, gait problem, myalgias, neck pain and neck stiffness. Negative for joint swelling. Skin: Negative for color change and rash. Allergic/Immunologic: Negative for food allergies and immunocompromised state. Neurological: Negative for dizziness, tremors, seizures, syncope, facial asymmetry, speech difficulty, weakness, light-headedness, numbness and headaches. Hematological: Does not bruise/bleed easily. Psychiatric/Behavioral: Negative for agitation, behavioral problems, confusion, decreased concentration, dysphoric mood, hallucinations, self-injury, sleep disturbance and suicidal ideas. The patient is nervous/anxious. The patient is not hyperactive. Anxiety  depression        Objective:     Vitals:    02/15/22 1537   BP: 128/70   Site: Left Upper Arm   Position: Sitting   Cuff Size: Large Adult   Pulse: 62   Weight: 186 lb (84.4 kg)   Height: 5' 3\" (1.6 m)       Physical Exam  Vitals and nursing note reviewed. Constitutional:       General: She is not in acute distress. Appearance: She is well-developed. She is not diaphoretic. HENT:      Head: Normocephalic and atraumatic. Right Ear: External ear normal.      Left Ear: External ear normal.      Nose: Nose normal.      Mouth/Throat:      Pharynx: No oropharyngeal exudate. Eyes:      General: No scleral icterus. Right eye: No discharge. Left eye: No discharge. Conjunctiva/sclera: Conjunctivae normal.      Pupils: Pupils are equal, round, and reactive to light. Neck:      Thyroid: No thyromegaly. Cardiovascular:      Rate and Rhythm: Normal rate and regular rhythm. Heart sounds: Normal heart sounds. No murmur heard. No friction rub. No gallop.        Comments: CABG  11/11/2020 at [place]  Right chest breast  Numbness   Pulmonary:      Effort: Pulmonary effort is normal. No respiratory distress. Breath sounds: Normal breath sounds. No wheezing or rales. Chest:      Chest wall: No tenderness. Abdominal:      General: Bowel sounds are normal. There is no distension. Palpations: Abdomen is soft. Tenderness: There is no abdominal tenderness. There is no guarding or rebound. Musculoskeletal:         General: Tenderness present. Right shoulder: Tenderness present. Left shoulder: Tenderness and bony tenderness present. Decreased range of motion. Decreased strength. Left elbow: Tenderness present. Right wrist: Tenderness present. Left wrist: Tenderness present. Right hand: Tenderness present. Decreased range of motion. Decreased strength. Left hand: Tenderness and bony tenderness present. Decreased range of motion. Decreased strength. Decreased sensation. Cervical back: Neck supple. Spasms, tenderness and bony tenderness present. No edema, erythema or rigidity. Muscular tenderness present. Decreased range of motion. Thoracic back: Spasms, tenderness and bony tenderness present. Lumbar back: Spasms, tenderness and bony tenderness present. Decreased range of motion. Back:       Right hip: Tenderness and bony tenderness present. Left hip: Tenderness and bony tenderness present. Right upper leg: Tenderness present. Left upper leg: Tenderness present. Right knee: Bony tenderness present. Tenderness present. Left knee: Bony tenderness present. Tenderness present. Right ankle: Tenderness present. Left ankle: Tenderness present. Right foot: Tenderness present. Left foot: Tenderness present. Comments: Trigger finger injections in past   Skin:     General: Skin is warm. Coloration: Skin is not pale. Findings: No erythema or rash.    Neurological:      Mental Status: She is alert and oriented to person, place, and time. She is not disoriented. Cranial Nerves: Cranial nerves are intact. No cranial nerve deficit. Sensory: Sensation is intact. No sensory deficit. Motor: Motor function is intact. No atrophy or abnormal muscle tone. Coordination: Coordination is intact. Coordination normal.      Gait: Gait abnormal.      Deep Tendon Reflexes: Reflexes are normal and symmetric. Babinski sign absent on the right side. Reflex Scores:       Tricep reflexes are 2+ on the right side and 2+ on the left side. Bicep reflexes are 2+ on the right side and 2+ on the left side. Brachioradialis reflexes are 2+ on the right side and 2+ on the left side. Patellar reflexes are 2+ on the right side and 2+ on the left side. Achilles reflexes are 2+ on the right side and 2+ on the left side. Comments: Motor 4/5 LLE 4/5 BUE  SLR-   Psychiatric:         Attention and Perception: Attention and perception normal. She is attentive. Mood and Affect: Mood and affect normal. Mood is not anxious or depressed. Affect is not labile, blunt, angry or inappropriate. Speech: Speech normal. She is communicative. Speech is not rapid and pressured, delayed, slurred or tangential.         Behavior: Behavior normal. Behavior is not agitated, slowed, aggressive, withdrawn, hyperactive or combative. Behavior is cooperative. Thought Content: Thought content normal. Thought content is not paranoid or delusional. Thought content does not include homicidal or suicidal ideation. Thought content does not include homicidal or suicidal plan. Cognition and Memory: Cognition and memory normal. Memory is not impaired. She does not exhibit impaired recent memory or impaired remote memory. Judgment: Judgment normal. Judgment is not impulsive or inappropriate.       Comments:  anxiety depression        HARIKA  Patricks test  positive  Yeoman's positive  Gaenslen's  positive  Kemps  positive         Assessment:     1. Lumbar spondylosis    2. Primary osteoarthritis of left shoulder    3. Fibromyalgia    4. Myofascial pain syndrome    5. Chronic left shoulder pain    6. Spinal stenosis of lumbar region, unspecified whether neurogenic claudication present    7. DDD (degenerative disc disease), lumbar    8. SI (sacroiliac) joint inflammation (HCC)    9. Chronic pain syndrome    10. Shoulder impingement syndrome, left    11. Chronic shoulder bursitis, left            Plan:      · OARRS reviewed. Current MED: 20  · Patient was not offered naloxone for home. · Discussed long term side effects of medications, tolerance, dependency and addiction. · Previous UDS reviewed  · UDS preformed today for compliance. · Patient told can not receive any pain medications from any other source. · No evidence of abuse, diversion or aberrant behavior.  Medications and/or procedures to improve function and quality of life- patient understanding with this and that may not be pain free   Discussed with patient about safe storage of medications at home   Discussed possible weaning of medication dosing dependent on treatment/procedure results.  Testing:none    Procedures: discussed wants to wait    Discussed with patient about risks with procedure including infection, reaction to medication, increased pain, or bleeding.  Medications:   If patient is on Norco Flexeril Lidoderm blood thinners will need approval to hold yes or no:ASA Plavix Dr Alyson Rae. Prescribed:   Orders Placed This Encounter   Medications    HYDROcodone-acetaminophen (NORCO) 5-325 MG per tablet     Sig: Take 1 tablet by mouth every 6 hours as needed for Pain for up to 30 days. Dispense:  120 tablet     Refill:  0     Reduce doses taken as pain becomes manageable       Return in about 3 months (around 5/15/2022) for Follow up pain medications.                Electronically signed by Bhargavi Magana, LEIGHA - CNP on2/15/2022 at 3:57 PM

## 2022-02-23 ENCOUNTER — HOSPITAL ENCOUNTER (OUTPATIENT)
Age: 62
Discharge: HOME | End: 2022-02-23
Payer: MEDICARE

## 2022-02-23 LAB
AVERAGE GLUCOSE: 108 MG/DL (ref 70–126)
GLUCOSE FASTING: 100 MG/DL (ref 70–108)
HBA1C MFR BLD: 5.6 % (ref 4.4–6.4)

## 2022-02-23 PROCEDURE — 83527 ASSAY OF INSULIN: CPT

## 2022-02-23 PROCEDURE — 36415 COLL VENOUS BLD VENIPUNCTURE: CPT

## 2022-02-23 PROCEDURE — 83036 HEMOGLOBIN GLYCOSYLATED A1C: CPT

## 2022-02-23 PROCEDURE — 86787 VARICELLA-ZOSTER ANTIBODY: CPT

## 2022-02-23 PROCEDURE — 82947 ASSAY GLUCOSE BLOOD QUANT: CPT

## 2022-02-23 PROCEDURE — 83525 ASSAY OF INSULIN: CPT

## 2022-02-25 LAB — VZV IGG SER QL IA: 2.19

## 2022-02-27 LAB — MISC. #1 REFERENCE GROUP TEST: NORMAL

## 2022-05-03 ENCOUNTER — PATIENT MESSAGE (OUTPATIENT)
Dept: CARDIOLOGY | Age: 62
End: 2022-05-03

## 2022-05-03 ENCOUNTER — OFFICE VISIT (OUTPATIENT)
Dept: CARDIOLOGY CLINIC | Age: 62
End: 2022-05-03
Payer: MEDICARE

## 2022-05-03 VITALS
DIASTOLIC BLOOD PRESSURE: 78 MMHG | SYSTOLIC BLOOD PRESSURE: 121 MMHG | HEIGHT: 63 IN | BODY MASS INDEX: 34.2 KG/M2 | WEIGHT: 193 LBS

## 2022-05-03 DIAGNOSIS — R00.2 HEART PALPITATIONS: ICD-10-CM

## 2022-05-03 DIAGNOSIS — E78.1 HIGH TRIGLYCERIDES: Primary | ICD-10-CM

## 2022-05-03 DIAGNOSIS — I25.10 CORONARY ARTERY DISEASE INVOLVING NATIVE CORONARY ARTERY OF NATIVE HEART WITHOUT ANGINA PECTORIS: ICD-10-CM

## 2022-05-03 DIAGNOSIS — R06.02 SOB (SHORTNESS OF BREATH) ON EXERTION: ICD-10-CM

## 2022-05-03 DIAGNOSIS — Z95.1 HX OF CABG: ICD-10-CM

## 2022-05-03 PROCEDURE — 99213 OFFICE O/P EST LOW 20 MIN: CPT | Performed by: NURSE PRACTITIONER

## 2022-05-03 PROCEDURE — 93000 ELECTROCARDIOGRAM COMPLETE: CPT | Performed by: NURSE PRACTITIONER

## 2022-05-03 RX ORDER — NITROGLYCERIN 0.4 MG/1
0.4 TABLET SUBLINGUAL EVERY 5 MIN PRN
Qty: 25 TABLET | Refills: 3 | Status: SHIPPED | OUTPATIENT
Start: 2022-05-03

## 2022-05-03 RX ORDER — ACETAMINOPHEN 500 MG
500 TABLET ORAL EVERY 6 HOURS PRN
COMMUNITY

## 2022-05-03 RX ORDER — NYSTATIN 100000 [USP'U]/G
POWDER TOPICAL
COMMUNITY
Start: 2022-02-22

## 2022-05-03 NOTE — PROGRESS NOTES
Denies chest pain, dizziness, swelling lower legs/ feet      C/o SOB with exertion and palpitations    EKG today

## 2022-05-03 NOTE — PROGRESS NOTES
[address] [place] [address] [address] [facility] [address]  Dept: [phone]  Dept Fax: [phone]  Loc: [phone]    Visit Date: 5/3/2022    Ms. Isak Mendez is a 57 y.o. female  who presented for: 1 year follow-up    Chief Complaint   Patient presents with    1 Year Follow Up    Coronary Artery Disease       HPI:   HPI   Last seen in the office on 6/3/2021 per Dr. Millie Mathis. Per office note:  61 yo F hx of CABG x 2 2015/PCI due to failed grafts who presents for follow-up. She has had ISR requiring intervention 2/2018--she had brachytherapy at [facility] in [place], [place]. Re-do CABG 11/20for failed brachytherapy (LIMA to LAD). RICH to LAD and SVG to D1. At [facility]. Recurrent CP 4/21; cath then neg; grafts patent. GI eval; GERD with gastritis - med Rx. No chest pain, angina, HART, orthopnea, PND, sob at rest, palpitations, LE edema, or syncope. She is on ASA/Effient. EF 50-55%, mild-moderate MR. She uses occasional NTG SL, but this is stable. Assessment and plan:  CABG x 2, 2015  Hx of PCI for failed grafts  S/p Brachytherapy at [facility], 2/2018  Re-do CABG for failed brachytherapy (LIMA to LAD). RICH to LAD and SVG to D1. At [facility]  Recurrent CP; cath then neg; grafts patent. GI eval; GERD with gastritis - med Rx  Preserved EF  Mild-moderate MR  Stable symptoms, doing well. Very infrequent. TG 150s. Vascepa for CV risk reduction and elevated TG despite trying maximal meds. D/c Fish Oil. Discussed diet/exercise/BP/weight loss/health lifestyle choices/lipids; the patient understands the goals and will try to comply.     Disposition:  1 year       Current Outpatient Medications:     acetaminophen (TYLENOL) 500 MG tablet, Take 500 mg by mouth every 6 hours as needed, Disp: , Rfl:     NYAMYC 417830 UNIT/GM powder, , Disp: , Rfl:     nitroGLYCERIN (NITROSTAT) 0.4 MG SL tablet, Place 1 tablet under the tongue every 5 minutes as needed for Chest pain up to max of 3 total doses. If no relief after 3rd dose, call 911., Disp: 25 tablet, Rfl: 3    atorvastatin (LIPITOR) 80 MG tablet, TAKE 1 TABLET DAILY, Disp: 90 tablet, Rfl: 2    furosemide (LASIX) 20 MG tablet, Take 1 tablet by mouth daily, Disp: 90 tablet, Rfl: 3    metoprolol tartrate (LOPRESSOR) 25 MG tablet, Take 0.5 tablets by mouth 2 times daily, Disp: 90 tablet, Rfl: 3    pantoprazole sodium (PROTONIX) 40 MG PACK packet, Take 40 mg by mouth every morning (before breakfast), Disp: , Rfl:     clopidogrel (PLAVIX) 75 MG tablet, Take 75 mg by mouth daily, Disp: , Rfl:     lidocaine (LIDODERM) 5 %, Place 1 patch onto the skin daily 12 hours on, 12 hours off., Disp: , Rfl:     Cholecalciferol (VITAMIN D3) 50 MCG (2000 UT) CAPS, Take 5,000 Units by mouth daily , Disp: , Rfl:     cyclobenzaprine (FLEXERIL) 10 MG tablet, Take 10 mg by mouth 3 times daily as needed for Muscle spasms, Disp: , Rfl:     isosorbide mononitrate (IMDUR) 60 MG extended release tablet, Take 1 tablet by mouth daily (Patient taking differently: Take 30 mg by mouth daily ), Disp: 90 tablet, Rfl: 3    Ascorbic Acid (VITAMIN C) 1000 MG tablet, Take 1,000 mg by mouth daily, Disp: , Rfl:     Multiple Vitamins-Minerals (CENTRUM SILVER 50+WOMEN) TABS, Take by mouth daily, Disp: , Rfl:     aspirin 81 MG tablet, Take 81 mg by mouth 2 times daily , Disp: , Rfl:     vitamin B-12 (CYANOCOBALAMIN) 1000 MCG tablet, Take 1,000 mcg by mouth daily, Disp: , Rfl:     DHA-EPA-Coenzyme Q10-Vitamin E (COQ-10 & FISH OIL PO), Take 1 capsule by mouth daily , Disp: , Rfl:     Past Medical History  Emma  has a past medical history of Arthritis, CAD (coronary artery disease), Fibromyalgia, Heart disease, and Hypertension. Social History  Emma  reports that she quit smoking about 7 years ago. Her smoking use included cigarettes. She smoked 0.50 packs per day. She quit smokeless tobacco use about 6 years ago. She reports current alcohol use. She reports that she does not use drugs. Family History  Emma family history includes Arthritis in her father; Heart Disease in her brother and father; Liver Disease in her mother. There is no family history of bicuspid aortic valve, aneurysms, heart transplant, pacemakers, defibrillators, or sudden cardiac death. Past Surgical History   Past Surgical History:   Procedure Laterality Date     SECTION  , 79    x2    CORONARY ANGIOPLASTY WITH STENT PLACEMENT  , , 2017    x3    CORONARY ARTERY BYPASS GRAFT  2015    x2     HERNIA REPAIR  1998    OTHER SURGICAL HISTORY      bracheballoon therapy   [address]     Today's visit:   Subjective:  Still following with GI - off Carafate - using Metamucil  No recurrent chest discomfort  No sx of tiredness or shortness of breath as was having prior to need of previous stents  No chest pain or chest pressure  No Lightheadedness or dizziness, no syncope or near syncope  No swelling  No irregular heart beat or feelings of heart beating fast; Only with anxiety - can control it  Use of Ntg ~ 1.5 months ago - did need use of 1 - went away; anxiety related  Tolerating medications  Using Vicks inhaler - helps her to relax at night if anxiety  No bleeding issues  Some balance issues - seeing neurologist in a few weeks  Following with Cardiologist at [place]    Review of Systems   Constitutional: Negative for chills and fever  HENT: Negative for congestion, sinus pressure, sneezing and sore throat. Eyes: Negative for pain, discharge, redness and itching. Respiratory: Negative for PND, orthopena, cough  Gastrointestinal: Negative for blood in stool, constipation, diarrhea   Endocrine: Negative for cold intolerance, heat intolerance, polydipsia. Genitourinary: Negative for dysuria, hematuria. Musculoskeletal: Negative for arthralgias, joint swelling and neck pain.    Neurological: Negative for numbness and headaches. Psychiatric/Behavioral: Negative for agitation, confusion, decreased concentration and dysphoric mood. Objective:     /78   Ht 5' 3\" (1.6 m)   Wt 193 lb (87.5 kg)   BMI 34.19 kg/m²     Wt Readings from Last 3 Encounters:   05/03/22 193 lb (87.5 kg)   02/15/22 186 lb (84.4 kg)   12/06/21 185 lb (83.9 kg)     BP Readings from Last 3 Encounters:   05/03/22 121/78   02/15/22 128/70   12/06/21 118/62       Nursing note and vitals reviewed. Physical Exam   Constitutional: Oriented to person, place, and time. Appears well-developed and well-nourished. No distress. Pleasant. Appears well. HENT:   Head: Normocephalic and atraumatic. Eyes: EOM are normal. Pupils are equal, round, and reactive to light. Neck: Normal range of motion. Neck supple. No JVD present. Cardiovascular: Normal rate, regular rhythm, normal heart sounds and intact distal pulses. No murmur heard. Pulmonary/Chest: Effort normal and breath sounds normal. No respiratory distress. No wheezes. No rales. Abdominal: Soft. Bowel sounds are normal. No distension. There is no tenderness. Musculoskeletal: Normal range of motion. No edema. Neurological: Alert and oriented to person, place, and time. No cranial nerve deficit. Coordination normal.   Skin: Skin is warm and dry. Psychiatric: Normal mood and affect.        No results found for: CKTOTAL, CKMB, CKMBINDEX    Lab Results   Component Value Date    WBC 5.9 10/12/2021    RBC 4.46 10/12/2021    HGB 14.1 10/12/2021    HCT 43.7 10/12/2021    MCV 98.0 10/12/2021    MCH 31.6 10/12/2021    MCHC 32.3 10/12/2021    RDW 12.9 12/01/2017     10/12/2021    MPV 9.8 10/12/2021       Lab Results   Component Value Date     10/12/2021    K 4.3 10/12/2021    K 4.1 11/04/2020     10/12/2021    CO2 26 10/12/2021    BUN 16 10/12/2021    LABALBU 4.5 10/12/2021    CREATININE 0.9 10/12/2021    CALCIUM 10.1 10/12/2021    LABGLOM 63 10/12/2021    GLUCOSE 115 10/12/2021 GLUCOSE 119 2017       Lab Results   Component Value Date    ALKPHOS 94 10/12/2021    ALT 23 10/12/2021    AST 19 10/12/2021    PROT 6.9 10/12/2021    BILITOT 0.5 10/12/2021    BILIDIR <0.2 2020    LABALBU 4.5 10/12/2021       Lab Results   Component Value Date    MG 2.2 2019       No results found for: INR, PROTIME      Lab Results   Component Value Date    LABA1C 5.6 2022       Lab Results   Component Value Date    TRIG 154 2020    HDL 56 2020    LDLCALC 31 2020       No results found for: TSH      Testing Reviewed:      I have individually reviewed the cardiac test below:  EK/3/2020  EKG 12 lead  Order: [id number]   Status: Final result     Visible to patient: Yes (seen)     Next appt: Today at 09:30 AM in Cardiology William Kothari, APRN - CNP)     0 Result Notes    Component Ref Range & Units 20 0352 11/3/20 0021   Ventricular Rate BPM 61  72    Atrial Rate BPM 61  72    P-R Interval ms 160  154    QRS Duration ms 78  76    Q-T Interval ms 424  402    QTc Calculation (Bazett) ms 426  440    P [place] degrees 20  50    R Axis degrees 2  10    T Axis degrees 39  67    Resulting [facility]  [facility]             Narrative & Impression    Normal sinus rhythm  Nonspecific T wave abnormality  Abnormal ECG  When compared with ECG of 2020 00:21,  Nonspecific T wave abnormality now evident in Inferior leads  Confirmed by Elsy Dietz MD, Yordan Matthews ([id number]) on 2020 6:27:22 PM             ECHO: No results found for this or any previous visit.      Nuclear stress: 2019  Summary    DTS = 8 (Low risk)    There were no significant perfusion abnormalities.    Left ventricular systolic function was normal.        Recommendation    Clinical correlation is recommended.    Aggressive risk factor management.        Signatures        ----------------------------------------------------------------    Electronically signed by Carlee Elliott MD (Interpreting  Cardiologist) on 2019 at 07:59       Cath: 2020  CARDIAC CATHETERIZATION     PATIENT NAME: Simon Streeter                 :        1960  MED REC NO:   [id number]                           ROOM:       0021  ACCOUNT NO:   [de-identified]                           ADMIT DATE: 2020  PROVIDER:     Rishabh Hobbs MD     DATE OF PROCEDURE:  2020     CARDIAC CATHETERIZATION     INDICATION FOR PROCEDURE:  Unstable angina with history of CABG and  recurrent in-stent restenosis requiring brachytherapy and recurrent  stenting of the LAD due to failed LIMA.     DESCRIPTION OF PROCEDURE:  After informed consent was obtained from the  patient, she was brought to the cardiac catheterization laboratory and  prepped in sterile fashion. The left femoral artery was chosen as the  primary point of access. Preprocedure timeout was completed. After  infiltration of the left inguinal region with 2% lidocaine using  micropuncture and modified Seldinger technique under fluoroscopic  guidance and ultrasound guidance, I was able to insert a 5-Ethiopian sheath  in the left femoral artery. We then performed diagnostic coronary  angiography using 5-Ethiopian JL-4, 5-Ethiopian JR-4 catheters.     CORONARY ANGIOGRAM:  LEFT MAIN:  Patent without any significant obstruction.     LAD:  Occluded proximally at the level of the stent.     LCX:  Mild luminal irregularities, gives rise to small OM1 and OM2  without any significant obstruction.     RCA:  No significant obstruction, gives rise to PDA and PLB without any  significant obstruction.     LIMA TO LAD:  Atretic.     SVG TO DIAGONAL:  Ostium, body, anastomosis of the SVG to diagonal are  patent. The SVG provides retrograde flow into the LAD, and there is a  subtotal 99% occlusion distal to where the diagonal branch enters into  LAD that supplies antegrade flow into a small LAD.     LV:  LVEF is 55% to 60%. No significant regional wall motion  abnormality.   Aortic valve appears tricuspid. No significant aneurysm  or dissection in the visualized portion of the aorta. The LV systolic  pressure is 234. No significant LV to AO systolic gradient. LVEDP is  16.     IMMEDIATE COMPLICATIONS:  None.     MEDICATIONS:  See EMR.     ACCESS:  Manual pressure was used for hemostasis.     ESTIMATED BLOOD LOSS:  Less than 10 mL.     SUMMARY:  Severe LAD ISR with retrograde perfusion from SVG to diagonal;  preserved LVEF.     PLAN:  1. Bedrest.  2.  Optimal medical therapy. 3.  Risk factor management. 4.  Routine access site care. 5.  I also discussed with the patient extensively. She wishes to be  transferred to [facility] for continued management. I  have discussed the case with Dr. Manohar Borja, who graciously accepted  care of the patient and will continue further management from there.     All the above was explained to the patient and the patient's family. They were agreeable and amenable to the above plan.  Jennifer Nelson MD  D: 11/05/2020      Assessment/Plan   CABG x 2, 2015  Hx of PCI for failed grafts  S/p Brachytherapy at [facility], 2/2018  Re-do CABG for failed brachytherapy (LIMA to LAD). RICH to LAD and SVG to D1. At [facility]  Recurrent CP; cath then neg; grafts patent. GI eval; GERD with gastritis - med Rx  Preserved EF: 55 - 60% per LV gram  Mild-moderate MR  No concerning sx for angina. Some anxiety issues - under control. Respiratory status stable. Tolerating meds; compliant. Last Lipids 2020 - check. Some balance issues - seeing neurologist.   Discussed diet/exercise/BP/weight loss/health lifestyle choices/lipids; the patient understands the goals and will try to comply.     Disposition:  1 year         Electronically signed by LEIGHA Treadwell CNP   5/3/2022 at 10:08 AM EDT

## 2022-05-03 NOTE — PATIENT INSTRUCTIONS
Continue current medications as prescribed. Follow-up with your PCP as scheduled. Follow-up with Dr. Murray Osborne as scheduled or sooner if need. Continue with efforts at regular exercise and diet to maintain healthy heart. You may receive a survey regarding the care you received during your visit. Your input is valuable to us. We encourage you to complete and return your survey. We hope you will choose us in the future for your healthcare needs.

## 2022-05-03 NOTE — TELEPHONE ENCOUNTER
Emma,   In review of your labs it looks like the last lipid profile was done in 2020. I would like to repeat the labs to see how your cholesterol and triglyceride levels are doing. Any lab affiliated with [facility] will be able to see the order in the computer. It is a lab that needs you to be fasting. Please obtain in the next week or two.   Thank you,   Radha Kothari, APRN - CNP

## 2022-05-17 ENCOUNTER — OFFICE VISIT (OUTPATIENT)
Dept: PHYSICAL MEDICINE AND REHAB | Age: 62
End: 2022-05-17
Payer: MEDICARE

## 2022-05-17 VITALS
SYSTOLIC BLOOD PRESSURE: 107 MMHG | BODY MASS INDEX: 34.2 KG/M2 | WEIGHT: 193 LBS | HEIGHT: 63 IN | DIASTOLIC BLOOD PRESSURE: 80 MMHG

## 2022-05-17 DIAGNOSIS — M79.18 MYOFASCIAL PAIN SYNDROME: ICD-10-CM

## 2022-05-17 DIAGNOSIS — M48.061 SPINAL STENOSIS OF LUMBAR REGION, UNSPECIFIED WHETHER NEUROGENIC CLAUDICATION PRESENT: ICD-10-CM

## 2022-05-17 DIAGNOSIS — M75.42 SHOULDER IMPINGEMENT SYNDROME, LEFT: ICD-10-CM

## 2022-05-17 DIAGNOSIS — M25.512 CHRONIC LEFT SHOULDER PAIN: ICD-10-CM

## 2022-05-17 DIAGNOSIS — M19.012 PRIMARY OSTEOARTHRITIS OF LEFT SHOULDER: ICD-10-CM

## 2022-05-17 DIAGNOSIS — M47.816 LUMBAR SPONDYLOSIS: Primary | ICD-10-CM

## 2022-05-17 DIAGNOSIS — G89.29 CHRONIC LEFT SHOULDER PAIN: ICD-10-CM

## 2022-05-17 DIAGNOSIS — M79.7 FIBROMYALGIA: ICD-10-CM

## 2022-05-17 DIAGNOSIS — M51.36 DDD (DEGENERATIVE DISC DISEASE), LUMBAR: ICD-10-CM

## 2022-05-17 DIAGNOSIS — M46.1 SI (SACROILIAC) JOINT INFLAMMATION (HCC): ICD-10-CM

## 2022-05-17 DIAGNOSIS — M75.52 CHRONIC SHOULDER BURSITIS, LEFT: ICD-10-CM

## 2022-05-17 DIAGNOSIS — G89.4 CHRONIC PAIN SYNDROME: ICD-10-CM

## 2022-05-17 PROCEDURE — 99214 OFFICE O/P EST MOD 30 MIN: CPT | Performed by: NURSE PRACTITIONER

## 2022-05-17 RX ORDER — HYDROCODONE BITARTRATE AND ACETAMINOPHEN 5; 325 MG/1; MG/1
1 TABLET ORAL EVERY 8 HOURS PRN
Qty: 90 TABLET | Refills: 0 | Status: SHIPPED | OUTPATIENT
Start: 2022-05-17 | End: 2022-06-16

## 2022-05-17 ASSESSMENT — ENCOUNTER SYMPTOMS
SHORTNESS OF BREATH: 0
DIARRHEA: 0
CHEST TIGHTNESS: 0
NAUSEA: 0
COUGH: 0
PHOTOPHOBIA: 0
SORE THROAT: 0
EYE PAIN: 0
RHINORRHEA: 0
SINUS PRESSURE: 0
WHEEZING: 0
COLOR CHANGE: 0
VOMITING: 0
CONSTIPATION: 0
BACK PAIN: 1
ABDOMINAL PAIN: 0

## 2022-05-17 NOTE — PROGRESS NOTES
[address] [facility]  [address]  Dept: [phone]  Dept Fax: [phone]: [phone]    Visit Date: 5/17/2022    Functionality Assessment/Goals Worksheet     On a scale of 0 (Does not Interfere) to 10 (Completely Interferes)     1. Which number describes how during the past week pain has interfered with       the following:  A. General Activity:  8  B. Mood: 3  C. Walking Ability:  5  D. Normal Work (Includes both work outside the home and housework):  8  E. Relations with Other People:   3  F. Sleep:   9  G. Enjoyment of Life:   4    2. Patient Prefers to Take their Pain Medications:     []  On a regular basis   [x]  Only when necessary    []  Does not take pain medications    3. What are the Patient's Goals/Expectations for Visiting Pain Management? []  Learn about my pain    [x]  Receive Medication   []  Physical Therapy     []  Treat Depression   [x]  Receive Injections    []  Treat Sleep   []  Deal with Anxiety and Stress   []  Treat Opoid Dependence/Addiction   []  Other:      HPI:   Jeanette Blair is a 57 y.o. female is here today for    Chief Complaint: Low back pain, Mid back pain, SI pain and Shoulder pain    HPI   F/U had  Dr Venessa Rinne left shoulder steroid injection  3 weeks ago states helped 50%. She has been going to Chiropractor past 3 weeks every week. She has been having  imbalance issues with walking. She  Is following Neurology soon for consult. She never had Lumbar Facet MBB #1  L4-5,5-S1 bilateral due to traveling to [place]. She continues to have low back bilateral SI hip thigh pain. Dr Alan Abdi did not recommend and lumbar surgery. TPI  Cervical thoracic  7/7/2021 states she received about  65% pain relief or improvement  for 6 weeks. She had  left shoulder steroid injection 3/24/2021 75% continued relief.  She had CABG  11/11/2020 and is finished with Cardiac Rehab now and continues HEP.  She has had steroid injection to shoulder in past and TPI injections with 40% pain relief for 4-6 weeks but then had CABG surgery.  She continues to take Norco. Flexeril Lidoderm   She had Lumbar Cervical MRI and had consult with Dr Angela Ashton Karla no stenosis no surgery recommended. She was referred to Dr May Guy rheumatology now [facility]      Pain increases with bending, lifting, twisting , reaching, pushing, pulling, walking, standing, stairs and getting up and down. Treatments Tried PT, ice, heat, Chiropractor, NSAIDS, narcotics, muscle relaxer, OTC rubs creams patches, massage or TPI, steroid burst and injections  Pain Description sharp, shooting, stabbing, burning, pressure, throbbing, aching, numbness and tingling      She denies any ER visits or new health issues since last visit. Pain scale with out pain medications or at its worst is 8/10.had a garage sale  Pain scale with pain medications or at its best is 3/10. Last dose of Cameron was yesterday  Drug screen reviewed from 2/2022 and was appropriate  Pill count completed  today and WNL: Yes         Radiology:  FINDINGS:   There is 3 mm of retrolisthesis of L1 on L2. There is loss of disc height at this level.       The other lumbar vertebral bodies are normally aligned. Zan Lbum is normal marrow signal throughout. Zan Blum is no bone marrow edema.  There are no compression fractures.  No pars defects are noted.  There is disc desiccation throughout.       The distal spinal cord, conus medullaris and cauda equina are normal.        There are no gross abnormalities in the visualized aspects of the distal thoracic spine.       On the axial images, at T12-L1, there are no degenerative changes. There is no spinal canal or foraminal stenosis.       At L1-L2, there is mild loss of disc height. There is a diffuse disc bulge. There is minimal spinal canal stenosis.  There is no foraminal stenosis.       At L2-L3, there is a diffuse disc bulge. There are very mild facet degenerative changes. There is minimal spinal canal stenosis. There is no foraminal stenosis.       At L3-L4, there are mild facet degenerative changes. There is a diffuse disc bulge. There is mild spinal canal stenosis. There is no foraminal stenosis.       At L4-L5, there are very mild facet degenerative changes. There is no focal disc abnormality. There is no spinal canal stenosis. There is no foraminal stenosis.       At L5-S1, there are mild facet degenerative changes. There is no spinal canal or foraminal stenosis.       There are no suspicious findings in the visualized aspects of the retroperitoneum and paraspinal soft tissues.               Impression    Mild degenerative changes in the lumbar spine without significant spinal canal or foraminal stenosis at any level.         FINDINGS:               The cervical vertebral bodies are normally aligned. There is normal marrow signal throughout. No suspicious osseous lesions are present.  The facet joints are normally aligned.       The cervical spinal cord is of normal caliber and signal intensity.  The visualized aspects of the posterior fossa are normal.       On the axial images, at C2-C3, there are no degenerative changes. There is no spinal canal or foraminal stenosis.       At C3-C4, there are very mild left-sided facet degenerative changes. There is no spinal canal or foraminal stenosis.       At C4-C5, there are left-sided facet degenerative changes. There is no spinal canal stenosis. There is mild left foraminal stenosis.       At C5-C6, there is no spinal canal or foraminal stenosis.       At C6-C7, there is no spinal canal or foraminal stenosis.       At C7-T1, there is no spinal canal or foraminal stenosis.       There are no suspicious findings in the cervical soft tissues.               Impression           1. Normal cervical spinal cord. 2. Mild left-sided facet degenerative changes.  There is mild depression        Objective:     Vitals:    05/17/22 1455   BP: 107/80   Site: Left Upper Arm   Position: Sitting   Cuff Size: Large Adult   Weight: 193 lb (87.5 kg)   Height: 5' 3\" (1.6 m)       Physical Exam  Vitals and nursing note reviewed. Constitutional:       General: She is not in acute distress. Appearance: She is well-developed. She is not diaphoretic. HENT:      Head: Normocephalic and atraumatic. Right Ear: External ear normal.      Left Ear: External ear normal.      Nose: Nose normal.      Mouth/Throat:      Pharynx: No oropharyngeal exudate. Eyes:      General: No scleral icterus. Right eye: No discharge. Left eye: No discharge. Conjunctiva/sclera: Conjunctivae normal.      Pupils: Pupils are equal, round, and reactive to light. Neck:      Thyroid: No thyromegaly. Cardiovascular:      Rate and Rhythm: Normal rate and regular rhythm. Heart sounds: Normal heart sounds. No murmur heard. No friction rub. No gallop. Comments: [facility]  11/11/[facility]  Right chest breast  Numbness   Pulmonary:      Effort: Pulmonary effort is normal. No respiratory distress. Breath sounds: Normal breath sounds. No wheezing or rales. Chest:      Chest wall: No tenderness. Abdominal:      General: Bowel sounds are normal. There is no distension. Palpations: Abdomen is soft. Tenderness: There is no abdominal tenderness. There is no guarding or rebound. Musculoskeletal:         General: Tenderness present. Right shoulder: Tenderness present. Left shoulder: Tenderness and bony tenderness present. Decreased range of motion. Decreased strength. Left elbow: Tenderness present. Right wrist: Tenderness present. Left wrist: Tenderness present. Right hand: Tenderness present. Decreased range of motion. Decreased strength. Left hand: Tenderness and bony tenderness present. Decreased range of motion. Decreased strength. Decreased sensation. Cervical back: Neck supple. Spasms, tenderness and bony tenderness present. No edema, erythema or rigidity. Muscular tenderness present. Decreased range of motion. Thoracic back: Spasms, tenderness and bony tenderness present. Lumbar back: Spasms, tenderness and bony tenderness present. Decreased range of motion. Back:       Right hip: Tenderness and bony tenderness present. Left hip: Tenderness and bony tenderness present. Right upper leg: Tenderness present. Left upper leg: Tenderness present. Right knee: Bony tenderness present. Tenderness present. Left knee: Bony tenderness present. Tenderness present. Right ankle: Tenderness present. Left ankle: Tenderness present. Right foot: Tenderness present. Left foot: Tenderness present. Comments: Trigger finger injections in past   Skin:     General: Skin is warm. Coloration: Skin is not pale. Findings: No erythema or rash. Neurological:      Mental Status: She is alert and oriented to person, place, and time. She is not disoriented. Cranial Nerves: Cranial nerves are intact. No cranial nerve deficit. Sensory: Sensation is intact. No sensory deficit. Motor: Motor function is intact. No atrophy or abnormal muscle tone. Coordination: Coordination is intact. Coordination normal.      Gait: Gait abnormal.      Deep Tendon Reflexes: Reflexes are normal and symmetric. Babinski sign absent on the right side. Reflex Scores:       Tricep reflexes are 2+ on the right side and 2+ on the left side. Bicep reflexes are 2+ on the right side and 2+ on the left side. Brachioradialis reflexes are 2+ on the right side and 2+ on the left side. Patellar reflexes are 2+ on the right side and 2+ on the left side. Achilles reflexes are 2+ on the right side and 2+ on the left side.      Comments: Motor 4/5 LLE 4/5 BUE  SLR-   Psychiatric: Attention and Perception: Attention and perception normal. She is attentive. Mood and Affect: Mood and affect normal. Mood is not anxious or depressed. Affect is not labile, blunt, angry or inappropriate. Speech: Speech normal. She is communicative. Speech is not rapid and pressured, delayed, slurred or tangential.         Behavior: Behavior normal. Behavior is not agitated, slowed, aggressive, withdrawn, hyperactive or combative. Behavior is cooperative. Thought Content: Thought content normal. Thought content is not paranoid or delusional. Thought content does not include homicidal or suicidal ideation. Thought content does not include homicidal or suicidal plan. Cognition and Memory: Cognition and memory normal. Memory is not impaired. She does not exhibit impaired recent memory or impaired remote memory. Judgment: Judgment normal. Judgment is not impulsive or inappropriate. Comments:  anxiety depression        HARIKA  Patricks test  positive  Yeoman's  or Gaenslen's positive  Kemps  positive  Spurlings  na  Man's na         Assessment:     1. Lumbar spondylosis    2. Primary osteoarthritis of left shoulder    3. Fibromyalgia    4. Myofascial pain syndrome    5. Chronic left shoulder pain    6. Spinal stenosis of lumbar region, unspecified whether neurogenic claudication present    7. DDD (degenerative disc disease), lumbar    8. SI (sacroiliac) joint inflammation (HCC)    9. Chronic pain syndrome    10. Shoulder impingement syndrome, left    11. Chronic shoulder bursitis, left            Plan:      · OARRS reviewed. Current MED: 15  · Patient was not offered naloxone for home.  Testing Labs or Radiology reviewed: cervical lumbar MRI     Procedures:Lumbar facet MBB if need   Discussed with patient about risks with procedure including infection, reaction to medication, increased pain, or bleeding.    Medications:Norco flexeril Lidoderm patches   If patient is on blood thinners will need approval to hold yes or no:ASA Plavix Dr Carito Tinoco Does patient have implanted devices? Stimulators, AICD or Pacemaker:N/A      Meds. Prescribed:   Orders Placed This Encounter   Medications    HYDROcodone-acetaminophen (NORCO) 5-325 MG per tablet     Sig: Take 1 tablet by mouth every 8 hours as needed for Pain for up to 30 days. Dispense:  90 tablet     Refill:  0     Reduce doses taken as pain becomes manageable       Return in about 3 months (around 8/17/2022).        Electronically signed by LEIGHA Lane CNP on5/17/2022 at 3:11 PM

## 2022-06-27 ENCOUNTER — HOSPITAL ENCOUNTER (OUTPATIENT)
Dept: NON INVASIVE DIAGNOSTICS | Age: 62
Discharge: HOME | End: 2022-06-27
Payer: MEDICARE

## 2022-06-27 DIAGNOSIS — I35.1 AORTIC VALVE INSUFFICIENCY, ETIOLOGY OF CARDIAC VALVE DISEASE UNSPECIFIED: ICD-10-CM

## 2022-06-27 LAB
LV EF: 53 %
LVEF MODALITY: NORMAL

## 2022-06-27 PROCEDURE — 93306 TTE W/DOPPLER COMPLETE: CPT

## 2022-07-05 RX ORDER — ATORVASTATIN CALCIUM 80 MG/1
TABLET, FILM COATED ORAL
Qty: 90 TABLET | Refills: 3 | Status: SHIPPED | OUTPATIENT
Start: 2022-07-05

## 2022-08-11 ENCOUNTER — HOSPITAL ENCOUNTER (OUTPATIENT)
Age: 62
Discharge: HOME | End: 2022-08-11
Payer: MEDICARE

## 2022-08-11 LAB
ALBUMIN SERPL-MCNC: 4.5 G/DL (ref 3.5–5.1)
ALP BLD-CCNC: 86 U/L (ref 38–126)
ALT SERPL-CCNC: 28 U/L (ref 11–66)
ANION GAP SERPL CALCULATED.3IONS-SCNC: 11 MEQ/L (ref 8–16)
AST SERPL-CCNC: 20 U/L (ref 5–40)
AVERAGE GLUCOSE: 114 MG/DL (ref 70–126)
BASOPHILS # BLD: 1 %
BASOPHILS ABSOLUTE: 0.1 THOU/MM3 (ref 0–0.1)
BILIRUB SERPL-MCNC: 0.6 MG/DL (ref 0.3–1.2)
BUN BLDV-MCNC: 21 MG/DL (ref 7–22)
CALCIUM SERPL-MCNC: 10.1 MG/DL (ref 8.5–10.5)
CHLORIDE BLD-SCNC: 103 MEQ/L (ref 98–111)
CO2: 27 MEQ/L (ref 23–33)
CREAT SERPL-MCNC: 0.9 MG/DL (ref 0.4–1.2)
EOSINOPHIL # BLD: 4.6 %
EOSINOPHILS ABSOLUTE: 0.3 THOU/MM3 (ref 0–0.4)
ERYTHROCYTE [DISTWIDTH] IN BLOOD BY AUTOMATED COUNT: 12.1 % (ref 11.5–14.5)
ERYTHROCYTE [DISTWIDTH] IN BLOOD BY AUTOMATED COUNT: 44.1 FL (ref 35–45)
FOLATE: > 20 NG/ML (ref 4.8–24.2)
GFR SERPL CREATININE-BSD FRML MDRD: 63 ML/MIN/1.73M2
GLUCOSE BLD-MCNC: 122 MG/DL (ref 70–108)
HBA1C MFR BLD: 5.8 % (ref 4.4–6.4)
HCT VFR BLD CALC: 44.5 % (ref 37–47)
HEMOGLOBIN: 14.4 GM/DL (ref 12–16)
IMMATURE GRANS (ABS): 0.02 THOU/MM3 (ref 0–0.07)
IMMATURE GRANULOCYTES: 0.3 %
LYMPHOCYTES # BLD: 35.4 %
LYMPHOCYTES ABSOLUTE: 2.4 THOU/MM3 (ref 1–4.8)
MCH RBC QN AUTO: 31.9 PG (ref 26–33)
MCHC RBC AUTO-ENTMCNC: 32.4 GM/DL (ref 32.2–35.5)
MCV RBC AUTO: 98.5 FL (ref 81–99)
MONOCYTES # BLD: 6.4 %
MONOCYTES ABSOLUTE: 0.4 THOU/MM3 (ref 0.4–1.3)
NUCLEATED RED BLOOD CELLS: 0 /100 WBC
PLATELET # BLD: 301 THOU/MM3 (ref 130–400)
PMV BLD AUTO: 9.7 FL (ref 9.4–12.4)
POTASSIUM SERPL-SCNC: 4.6 MEQ/L (ref 3.5–5.2)
RBC # BLD: 4.52 MILL/MM3 (ref 4.2–5.4)
SEG NEUTROPHILS: 52.3 %
SEGMENTED NEUTROPHILS ABSOLUTE COUNT: 3.5 THOU/MM3 (ref 1.8–7.7)
SODIUM BLD-SCNC: 141 MEQ/L (ref 135–145)
TOTAL PROTEIN: 7.1 G/DL (ref 6.1–8)
TSH SERPL DL<=0.05 MIU/L-ACNC: 2.52 UIU/ML (ref 0.4–4.2)
VITAMIN B-12: > 2000 PG/ML (ref 211–911)
WBC # BLD: 6.7 THOU/MM3 (ref 4.8–10.8)

## 2022-08-11 PROCEDURE — 84443 ASSAY THYROID STIM HORMONE: CPT

## 2022-08-11 PROCEDURE — 36415 COLL VENOUS BLD VENIPUNCTURE: CPT

## 2022-08-11 PROCEDURE — 84155 ASSAY OF PROTEIN SERUM: CPT

## 2022-08-11 PROCEDURE — 80053 COMPREHEN METABOLIC PANEL: CPT

## 2022-08-11 PROCEDURE — 84425 ASSAY OF VITAMIN B-1: CPT

## 2022-08-11 PROCEDURE — 82607 VITAMIN B-12: CPT

## 2022-08-11 PROCEDURE — 84165 PROTEIN E-PHORESIS SERUM: CPT

## 2022-08-11 PROCEDURE — 82746 ASSAY OF FOLIC ACID SERUM: CPT

## 2022-08-11 PROCEDURE — 85025 COMPLETE CBC W/AUTO DIFF WBC: CPT

## 2022-08-11 PROCEDURE — 83036 HEMOGLOBIN GLYCOSYLATED A1C: CPT

## 2022-08-11 PROCEDURE — 86334 IMMUNOFIX E-PHORESIS SERUM: CPT

## 2022-08-11 PROCEDURE — 84207 ASSAY OF VITAMIN B-6: CPT

## 2022-08-14 LAB
IMMUNOFIXATION RESULT, SERUM: NORMAL
PROTEIN ELECTROPHORESIS, SERUM: NORMAL

## 2022-08-15 LAB — VITAMIN B6: 108.7 NMOL/L (ref 20–125)

## 2022-08-16 LAB — VITAMIN B1 WHOLE BLOOD: 144 NMOL/L (ref 70–180)

## 2022-10-02 RX ORDER — FUROSEMIDE 20 MG/1
TABLET ORAL
Qty: 90 TABLET | Refills: 3 | Status: SHIPPED | OUTPATIENT
Start: 2022-10-02

## 2023-03-20 ENCOUNTER — HOSPITAL ENCOUNTER (OUTPATIENT)
Age: 63
Discharge: HOME | End: 2023-03-20
Payer: MEDICARE

## 2023-03-20 LAB
ALT SERPL W/O P-5'-P-CCNC: 25 U/L (ref 11–66)
CREAT SERPL-MCNC: 0.9 MG/DL (ref 0.4–1.2)
GFR SERPL CREATININE-BSD FRML MDRD: > 60 ML/MIN/1.73M2

## 2023-03-20 PROCEDURE — 36415 COLL VENOUS BLD VENIPUNCTURE: CPT

## 2023-03-20 PROCEDURE — 84460 ALANINE AMINO (ALT) (SGPT): CPT

## 2023-03-20 PROCEDURE — 82565 ASSAY OF CREATININE: CPT

## 2023-06-30 RX ORDER — ATORVASTATIN CALCIUM 80 MG/1
TABLET, FILM COATED ORAL
Qty: 90 TABLET | Refills: 3 | OUTPATIENT
Start: 2023-06-30

## 2023-09-25 RX ORDER — FUROSEMIDE 20 MG/1
TABLET ORAL
Qty: 90 TABLET | Refills: 3 | OUTPATIENT
Start: 2023-09-25

## 2023-10-23 ENCOUNTER — HOSPITAL ENCOUNTER (OUTPATIENT)
Age: 63
Discharge: HOME | End: 2023-10-23
Payer: MEDICARE

## 2023-10-23 LAB
ALT SERPL W/O P-5'-P-CCNC: 15 U/L (ref 11–66)
CREAT SERPL-MCNC: 0.9 MG/DL (ref 0.4–1.2)
GFR SERPL CREATININE-BSD FRML MDRD: > 60 ML/MIN/1.73M2

## 2023-10-23 PROCEDURE — 84460 ALANINE AMINO (ALT) (SGPT): CPT

## 2023-10-23 PROCEDURE — 82565 ASSAY OF CREATININE: CPT

## 2023-10-23 PROCEDURE — 36415 COLL VENOUS BLD VENIPUNCTURE: CPT

## 2024-10-21 ENCOUNTER — LAB (OUTPATIENT)
Dept: LAB | Age: 64
End: 2024-10-21

## 2024-10-21 LAB
ALT SERPL W/O P-5'-P-CCNC: 28 U/L (ref 11–66)
CREAT SERPL-MCNC: 0.9 MG/DL (ref 0.4–1.2)
GFR SERPL CREATININE-BSD FRML MDRD: 71 ML/MIN/1.73M2

## 2024-12-16 ENCOUNTER — LAB (OUTPATIENT)
Dept: LAB | Age: 64
End: 2024-12-16

## 2024-12-18 LAB — HEAVY METAL: NORMAL

## 2025-04-16 ENCOUNTER — LAB (OUTPATIENT)
Dept: LAB | Age: 65
End: 2025-04-16

## 2025-04-16 LAB
ALT SERPL W/O P-5'-P-CCNC: 27 U/L (ref 10–35)
CREAT SERPL-MCNC: 1 MG/DL (ref 0.5–0.9)
GFR SERPL CREATININE-BSD FRML MDRD: 62 ML/MIN/1.73M2

## 2025-06-07 ENCOUNTER — LAB (OUTPATIENT)
Dept: LAB | Age: 65
End: 2025-06-07

## 2025-06-07 LAB
ANION GAP SERPL CALC-SCNC: 10 MEQ/L (ref 8–16)
BUN SERPL-MCNC: 15 MG/DL (ref 8–23)
CALCIUM SERPL-MCNC: 9.5 MG/DL (ref 8.8–10.2)
CHLORIDE SERPL-SCNC: 102 MEQ/L (ref 98–111)
CO2 SERPL-SCNC: 26 MEQ/L (ref 22–29)
CREAT SERPL-MCNC: 0.9 MG/DL (ref 0.5–0.9)
GFR SERPL CREATININE-BSD FRML MDRD: 71 ML/MIN/1.73M2
GLUCOSE FASTING: 113 MG/DL (ref 74–109)
POTASSIUM SERPL-SCNC: 4.7 MEQ/L (ref 3.5–5.2)
SODIUM SERPL-SCNC: 138 MEQ/L (ref 135–145)